# Patient Record
Sex: FEMALE | Race: BLACK OR AFRICAN AMERICAN | NOT HISPANIC OR LATINO | Employment: FULL TIME | URBAN - METROPOLITAN AREA
[De-identification: names, ages, dates, MRNs, and addresses within clinical notes are randomized per-mention and may not be internally consistent; named-entity substitution may affect disease eponyms.]

---

## 2022-05-11 DIAGNOSIS — Z01.818 OTHER SPECIFIED PRE-OPERATIVE EXAMINATION: Primary | ICD-10-CM

## 2022-05-12 ENCOUNTER — LAB VISIT (OUTPATIENT)
Dept: LAB | Facility: HOSPITAL | Age: 37
End: 2022-05-12
Attending: OBSTETRICS & GYNECOLOGY
Payer: MEDICAID

## 2022-05-12 DIAGNOSIS — Z01.818 OTHER SPECIFIED PRE-OPERATIVE EXAMINATION: ICD-10-CM

## 2022-05-12 DIAGNOSIS — Z01.818 OTHER SPECIFIED PRE-OPERATIVE EXAMINATION: Primary | ICD-10-CM

## 2022-05-12 PROCEDURE — 80053 COMPREHEN METABOLIC PANEL: CPT

## 2022-05-13 LAB
ALBUMIN SERPL-MCNC: 4 GM/DL (ref 3.5–5)
ALBUMIN/GLOB SERPL: 1.2 RATIO (ref 1.1–2)
ALP SERPL-CCNC: 80 UNIT/L (ref 40–150)
ALT SERPL-CCNC: 8 UNIT/L (ref 0–55)
AST SERPL-CCNC: 13 UNIT/L (ref 5–34)
BILIRUBIN DIRECT+TOT PNL SERPL-MCNC: 0.8 MG/DL
BUN SERPL-MCNC: 12.7 MG/DL (ref 7–18.7)
CALCIUM SERPL-MCNC: 9.7 MG/DL (ref 8.4–10.2)
CHLORIDE SERPL-SCNC: 106 MMOL/L (ref 98–107)
CO2 SERPL-SCNC: 23 MMOL/L (ref 22–29)
CREAT SERPL-MCNC: 0.85 MG/DL (ref 0.55–1.02)
GLOBULIN SER-MCNC: 3.3 GM/DL (ref 2.4–3.5)
GLUCOSE SERPL-MCNC: 66 MG/DL (ref 74–100)
POTASSIUM SERPL-SCNC: 4.8 MMOL/L (ref 3.5–5.1)
PROT SERPL-MCNC: 7.3 GM/DL (ref 6.4–8.3)
SODIUM SERPL-SCNC: 141 MMOL/L (ref 136–145)

## 2022-05-13 RX ORDER — LABETALOL 100 MG/1
100 TABLET, FILM COATED ORAL EVERY 12 HOURS
COMMUNITY
Start: 2022-02-23 | End: 2023-09-19

## 2022-05-16 ENCOUNTER — HOSPITAL ENCOUNTER (OUTPATIENT)
Facility: HOSPITAL | Age: 37
Discharge: HOME | End: 2022-05-16
Attending: INTERNAL MEDICINE | Admitting: INTERNAL MEDICINE
Payer: MEDICAID

## 2022-05-16 ENCOUNTER — ANESTHESIA EVENT (OUTPATIENT)
Dept: SURGERY | Facility: HOSPITAL | Age: 37
End: 2022-05-16
Payer: MEDICAID

## 2022-05-16 ENCOUNTER — ANESTHESIA (OUTPATIENT)
Dept: SURGERY | Facility: HOSPITAL | Age: 37
End: 2022-05-16
Payer: MEDICAID

## 2022-05-16 DIAGNOSIS — Z30.2 STERILIZATION: Primary | ICD-10-CM

## 2022-05-16 LAB
B-HCG UR QL: NEGATIVE
CTP QC/QA: YES

## 2022-05-16 PROCEDURE — 63600175 PHARM REV CODE 636 W HCPCS: Performed by: NURSE ANESTHETIST, CERTIFIED REGISTERED

## 2022-05-16 PROCEDURE — 71000015 HC POSTOP RECOV 1ST HR: Performed by: OBSTETRICS & GYNECOLOGY

## 2022-05-16 PROCEDURE — 25000003 PHARM REV CODE 250

## 2022-05-16 PROCEDURE — 63600175 PHARM REV CODE 636 W HCPCS: Performed by: ANESTHESIOLOGY

## 2022-05-16 PROCEDURE — 25000003 PHARM REV CODE 250: Performed by: OBSTETRICS & GYNECOLOGY

## 2022-05-16 PROCEDURE — 25000003 PHARM REV CODE 250: Performed by: NURSE ANESTHETIST, CERTIFIED REGISTERED

## 2022-05-16 PROCEDURE — 71000033 HC RECOVERY, INTIAL HOUR: Performed by: OBSTETRICS & GYNECOLOGY

## 2022-05-16 PROCEDURE — 81025 URINE PREGNANCY TEST: CPT | Performed by: OBSTETRICS & GYNECOLOGY

## 2022-05-16 PROCEDURE — 36000708 HC OR TIME LEV III 1ST 15 MIN: Performed by: OBSTETRICS & GYNECOLOGY

## 2022-05-16 PROCEDURE — 63600175 PHARM REV CODE 636 W HCPCS

## 2022-05-16 PROCEDURE — 25000003 PHARM REV CODE 250: Performed by: ANESTHESIOLOGY

## 2022-05-16 PROCEDURE — 00851 ANES IPER PX TUBAL LIGATION: CPT | Performed by: OBSTETRICS & GYNECOLOGY

## 2022-05-16 PROCEDURE — 36000709 HC OR TIME LEV III EA ADD 15 MIN: Performed by: OBSTETRICS & GYNECOLOGY

## 2022-05-16 PROCEDURE — 71000016 HC POSTOP RECOV ADDL HR: Performed by: OBSTETRICS & GYNECOLOGY

## 2022-05-16 PROCEDURE — 37000008 HC ANESTHESIA 1ST 15 MINUTES: Performed by: OBSTETRICS & GYNECOLOGY

## 2022-05-16 PROCEDURE — A4217 STERILE WATER/SALINE, 500 ML: HCPCS | Performed by: OBSTETRICS & GYNECOLOGY

## 2022-05-16 PROCEDURE — 37000009 HC ANESTHESIA EA ADD 15 MINS: Performed by: OBSTETRICS & GYNECOLOGY

## 2022-05-16 RX ORDER — HYDROCODONE BITARTRATE AND ACETAMINOPHEN 5; 325 MG/1; MG/1
1 TABLET ORAL EVERY 4 HOURS PRN
Status: DISCONTINUED | OUTPATIENT
Start: 2022-05-16 | End: 2022-05-16 | Stop reason: HOSPADM

## 2022-05-16 RX ORDER — MEPERIDINE HYDROCHLORIDE 25 MG/ML
12.5 INJECTION INTRAMUSCULAR; INTRAVENOUS; SUBCUTANEOUS EVERY 10 MIN PRN
Status: DISCONTINUED | OUTPATIENT
Start: 2022-05-16 | End: 2022-05-16

## 2022-05-16 RX ORDER — ACETAMINOPHEN 325 MG/1
650 TABLET ORAL EVERY 4 HOURS PRN
Status: DISCONTINUED | OUTPATIENT
Start: 2022-05-16 | End: 2022-05-16 | Stop reason: HOSPADM

## 2022-05-16 RX ORDER — SODIUM CHLORIDE, SODIUM LACTATE, POTASSIUM CHLORIDE, CALCIUM CHLORIDE 600; 310; 30; 20 MG/100ML; MG/100ML; MG/100ML; MG/100ML
INJECTION, SOLUTION INTRAVENOUS CONTINUOUS
Status: DISCONTINUED | OUTPATIENT
Start: 2022-05-16 | End: 2022-05-16 | Stop reason: HOSPADM

## 2022-05-16 RX ORDER — WATER 1 ML/ML
IRRIGANT IRRIGATION
Status: DISCONTINUED | OUTPATIENT
Start: 2022-05-16 | End: 2022-05-16 | Stop reason: HOSPADM

## 2022-05-16 RX ORDER — HYDROCODONE BITARTRATE AND ACETAMINOPHEN 5; 325 MG/1; MG/1
1 TABLET ORAL EVERY 6 HOURS PRN
Qty: 5 TABLET | Refills: 0 | Status: SHIPPED | OUTPATIENT
Start: 2022-05-16 | End: 2022-05-19

## 2022-05-16 RX ORDER — ROCURONIUM BROMIDE 10 MG/ML
INJECTION, SOLUTION INTRAVENOUS
Status: DISCONTINUED | OUTPATIENT
Start: 2022-05-16 | End: 2022-05-16

## 2022-05-16 RX ORDER — KETOROLAC TROMETHAMINE 30 MG/ML
INJECTION, SOLUTION INTRAMUSCULAR; INTRAVENOUS
Status: DISCONTINUED | OUTPATIENT
Start: 2022-05-16 | End: 2022-05-16

## 2022-05-16 RX ORDER — PROCHLORPERAZINE EDISYLATE 5 MG/ML
5 INJECTION INTRAMUSCULAR; INTRAVENOUS EVERY 30 MIN PRN
Status: DISCONTINUED | OUTPATIENT
Start: 2022-05-16 | End: 2022-05-16

## 2022-05-16 RX ORDER — IBUPROFEN 800 MG/1
800 TABLET ORAL EVERY 8 HOURS PRN
Qty: 30 TABLET | Refills: 2 | OUTPATIENT
Start: 2022-05-16 | End: 2023-03-23

## 2022-05-16 RX ORDER — PROPOFOL 10 MG/ML
VIAL (ML) INTRAVENOUS
Status: DISCONTINUED | OUTPATIENT
Start: 2022-05-16 | End: 2022-05-16

## 2022-05-16 RX ORDER — HYDROMORPHONE HYDROCHLORIDE 2 MG/ML
1 INJECTION, SOLUTION INTRAMUSCULAR; INTRAVENOUS; SUBCUTANEOUS EVERY 6 HOURS PRN
Status: DISCONTINUED | OUTPATIENT
Start: 2022-05-16 | End: 2022-05-16 | Stop reason: HOSPADM

## 2022-05-16 RX ORDER — DIPHENHYDRAMINE HYDROCHLORIDE 50 MG/ML
25 INJECTION INTRAMUSCULAR; INTRAVENOUS EVERY 6 HOURS PRN
Status: DISCONTINUED | OUTPATIENT
Start: 2022-05-16 | End: 2022-05-16

## 2022-05-16 RX ORDER — PROCHLORPERAZINE EDISYLATE 5 MG/ML
5 INJECTION INTRAMUSCULAR; INTRAVENOUS EVERY 6 HOURS PRN
Status: DISCONTINUED | OUTPATIENT
Start: 2022-05-16 | End: 2022-05-16 | Stop reason: HOSPADM

## 2022-05-16 RX ORDER — ONDANSETRON 4 MG/1
8 TABLET, ORALLY DISINTEGRATING ORAL EVERY 8 HOURS PRN
Status: DISCONTINUED | OUTPATIENT
Start: 2022-05-16 | End: 2022-05-16 | Stop reason: HOSPADM

## 2022-05-16 RX ORDER — HYDROMORPHONE HYDROCHLORIDE 2 MG/ML
0.2 INJECTION, SOLUTION INTRAMUSCULAR; INTRAVENOUS; SUBCUTANEOUS EVERY 5 MIN PRN
Status: DISCONTINUED | OUTPATIENT
Start: 2022-05-16 | End: 2022-05-16

## 2022-05-16 RX ORDER — SODIUM CHLORIDE, SODIUM GLUCONATE, SODIUM ACETATE, POTASSIUM CHLORIDE AND MAGNESIUM CHLORIDE 30; 37; 368; 526; 502 MG/100ML; MG/100ML; MG/100ML; MG/100ML; MG/100ML
1000 INJECTION, SOLUTION INTRAVENOUS CONTINUOUS
Status: DISCONTINUED | OUTPATIENT
Start: 2022-05-16 | End: 2022-05-16 | Stop reason: HOSPADM

## 2022-05-16 RX ORDER — BUPIVACAINE HCL/EPINEPHRINE 0.5-1:200K
VIAL (ML) INJECTION
Status: DISCONTINUED
Start: 2022-05-16 | End: 2022-05-16 | Stop reason: HOSPADM

## 2022-05-16 RX ORDER — LIDOCAINE HYDROCHLORIDE 10 MG/ML
INJECTION, SOLUTION EPIDURAL; INFILTRATION; INTRACAUDAL; PERINEURAL
Status: DISCONTINUED | OUTPATIENT
Start: 2022-05-16 | End: 2022-05-16

## 2022-05-16 RX ORDER — LIDOCAINE HYDROCHLORIDE 10 MG/ML
1 INJECTION, SOLUTION EPIDURAL; INFILTRATION; INTRACAUDAL; PERINEURAL ONCE
Status: DISCONTINUED | OUTPATIENT
Start: 2022-05-16 | End: 2022-05-16 | Stop reason: HOSPADM

## 2022-05-16 RX ORDER — HYDROCODONE BITARTRATE AND ACETAMINOPHEN 10; 325 MG/1; MG/1
1 TABLET ORAL EVERY 4 HOURS PRN
Status: DISCONTINUED | OUTPATIENT
Start: 2022-05-16 | End: 2022-05-16 | Stop reason: HOSPADM

## 2022-05-16 RX ORDER — ACETAMINOPHEN 500 MG
1000 TABLET ORAL
Status: COMPLETED | OUTPATIENT
Start: 2022-05-16 | End: 2022-05-16

## 2022-05-16 RX ORDER — ONDANSETRON 2 MG/ML
4 INJECTION INTRAMUSCULAR; INTRAVENOUS DAILY PRN
Status: DISCONTINUED | OUTPATIENT
Start: 2022-05-16 | End: 2022-05-16

## 2022-05-16 RX ORDER — IPRATROPIUM BROMIDE AND ALBUTEROL SULFATE 2.5; .5 MG/3ML; MG/3ML
3 SOLUTION RESPIRATORY (INHALATION)
Status: DISCONTINUED | OUTPATIENT
Start: 2022-05-16 | End: 2022-05-16

## 2022-05-16 RX ORDER — MIDAZOLAM HYDROCHLORIDE 1 MG/ML
INJECTION INTRAMUSCULAR; INTRAVENOUS
Status: COMPLETED
Start: 2022-05-16 | End: 2022-05-16

## 2022-05-16 RX ORDER — HYDROMORPHONE HYDROCHLORIDE 2 MG/ML
0.4 INJECTION, SOLUTION INTRAMUSCULAR; INTRAVENOUS; SUBCUTANEOUS EVERY 5 MIN PRN
Status: DISCONTINUED | OUTPATIENT
Start: 2022-05-16 | End: 2022-05-16

## 2022-05-16 RX ORDER — FENTANYL CITRATE 50 UG/ML
INJECTION, SOLUTION INTRAMUSCULAR; INTRAVENOUS
Status: DISCONTINUED | OUTPATIENT
Start: 2022-05-16 | End: 2022-05-16

## 2022-05-16 RX ORDER — ONDANSETRON HYDROCHLORIDE 2 MG/ML
INJECTION, SOLUTION INTRAMUSCULAR; INTRAVENOUS
Status: DISCONTINUED | OUTPATIENT
Start: 2022-05-16 | End: 2022-05-16

## 2022-05-16 RX ORDER — DEXAMETHASONE SODIUM PHOSPHATE 4 MG/ML
INJECTION, SOLUTION INTRA-ARTICULAR; INTRALESIONAL; INTRAMUSCULAR; INTRAVENOUS; SOFT TISSUE
Status: DISCONTINUED | OUTPATIENT
Start: 2022-05-16 | End: 2022-05-16

## 2022-05-16 RX ORDER — MIDAZOLAM HYDROCHLORIDE 1 MG/ML
2 INJECTION INTRAMUSCULAR; INTRAVENOUS
Status: ACTIVE | OUTPATIENT
Start: 2022-05-16 | End: 2022-05-16

## 2022-05-16 RX ORDER — SCOLOPAMINE TRANSDERMAL SYSTEM 1 MG/1
1 PATCH, EXTENDED RELEASE TRANSDERMAL
Status: DISCONTINUED | OUTPATIENT
Start: 2022-05-16 | End: 2022-05-16 | Stop reason: HOSPADM

## 2022-05-16 RX ADMIN — ONDANSETRON 4 MG: 2 INJECTION INTRAMUSCULAR; INTRAVENOUS at 10:05

## 2022-05-16 RX ADMIN — SODIUM CHLORIDE, POTASSIUM CHLORIDE, SODIUM LACTATE AND CALCIUM CHLORIDE: 600; 310; 30; 20 INJECTION, SOLUTION INTRAVENOUS at 10:05

## 2022-05-16 RX ADMIN — SUGAMMADEX 300 MG: 100 INJECTION, SOLUTION INTRAVENOUS at 10:05

## 2022-05-16 RX ADMIN — MIDAZOLAM HYDROCHLORIDE 2 MG: 1 INJECTION, SOLUTION INTRAMUSCULAR; INTRAVENOUS at 08:05

## 2022-05-16 RX ADMIN — ROCURONIUM BROMIDE 50 MG: 10 SOLUTION INTRAVENOUS at 09:05

## 2022-05-16 RX ADMIN — LIDOCAINE HYDROCHLORIDE 40 MG: 10 INJECTION, SOLUTION EPIDURAL; INFILTRATION; INTRACAUDAL; PERINEURAL at 09:05

## 2022-05-16 RX ADMIN — ACETAMINOPHEN 1000 MG: 500 TABLET ORAL at 08:05

## 2022-05-16 RX ADMIN — MIDAZOLAM HYDROCHLORIDE 2 MG: 1 INJECTION INTRAMUSCULAR; INTRAVENOUS at 08:05

## 2022-05-16 RX ADMIN — SODIUM CHLORIDE, POTASSIUM CHLORIDE, SODIUM LACTATE AND CALCIUM CHLORIDE: 600; 310; 30; 20 INJECTION, SOLUTION INTRAVENOUS at 09:05

## 2022-05-16 RX ADMIN — PROPOFOL 200 MG: 10 INJECTION, EMULSION INTRAVENOUS at 09:05

## 2022-05-16 RX ADMIN — KETOROLAC TROMETHAMINE 30 MG: 30 INJECTION, SOLUTION INTRAMUSCULAR at 10:05

## 2022-05-16 RX ADMIN — SCOPOLAMINE 1 PATCH: 1 PATCH TRANSDERMAL at 08:05

## 2022-05-16 RX ADMIN — HYDROMORPHONE HYDROCHLORIDE 0.4 MG: 2 INJECTION, SOLUTION INTRAMUSCULAR; INTRAVENOUS; SUBCUTANEOUS at 10:05

## 2022-05-16 RX ADMIN — DEXAMETHASONE SODIUM PHOSPHATE 4 MG: 4 INJECTION, SOLUTION INTRA-ARTICULAR; INTRALESIONAL; INTRAMUSCULAR; INTRAVENOUS; SOFT TISSUE at 09:05

## 2022-05-16 RX ADMIN — FENTANYL CITRATE 100 MCG: 50 INJECTION, SOLUTION INTRAMUSCULAR; INTRAVENOUS at 09:05

## 2022-05-16 NOTE — ANESTHESIA PROCEDURE NOTES
Intubation    Date/Time: 5/16/2022 9:26 AM  Performed by: Marybel Alexis CRNA  Authorized by: Oscar Lan MD     Intubation:     Induction:  Intravenous    Intubated:  Postinduction    Mask Ventilation:  Easy mask    Attempts:  1    Attempted By:  CRNA    Method of Intubation:  Direct    Blade:  Reynoso 2    Laryngeal View Grade: Grade I - full view of cords      Difficult Airway Encountered?: No      Complications:  None    Airway Device:  Oral endotracheal tube    Airway Device Size:  7.0    Style/Cuff Inflation:  Cuffed (inflated to minimal occlusive pressure)    Inflation Amount (mL):  6    Tube secured:  22    Secured at:  The lips    Placement Verified By:  Capnometry    Complicating Factors:  None    Findings Post-Intubation:  BS equal bilateral and atraumatic/condition of teeth unchanged

## 2022-05-16 NOTE — TRANSFER OF CARE
"Anesthesia Transfer of Care Note    Patient: Areli Loco    Procedure(s) Performed: Procedure(s) (LRB):  LIGATION, FALLOPIAN TUBE, LAPAROSCOPIC (N/A)    Patient location: PACU    Anesthesia Type: general    Transport from OR: Transported from OR on room air with adequate spontaneous ventilation    Post pain: adequate analgesia    Post assessment: no apparent anesthetic complications    Post vital signs: stable    Level of consciousness: sedated    Nausea/Vomiting: no nausea/vomiting    Complications: none    Transfer of care protocol was followed      Last vitals:   Visit Vitals  /88 (BP Location: Left arm, Patient Position: Lying)   Pulse 79   Temp 36.3 °C (97.3 °F) (Tympanic)   Resp (!) 22   Ht 5' 6" (1.676 m)   Wt 122.2 kg (269 lb 6.4 oz)   LMP  (LMP Unknown)   SpO2 95%   Breastfeeding No   BMI 43.48 kg/m²     "

## 2022-05-16 NOTE — ANESTHESIA PREPROCEDURE EVALUATION
05/16/2022  Areli Loco is a 36 y.o., female.    Past Medical History:   Diagnosis Date    Hypertension      Past Surgical History:   Procedure Laterality Date    CHOLECYSTECTOMY       PMH includes HBP, MO, TMJ (does not wear splint)  Pre-op Assessment    I have reviewed the Patient Summary Reports.    I have reviewed the NPO Status.   I have reviewed the Medications.     Review of Systems  Anesthesia Hx:  No problems with previous Anesthesia  Denies Family Hx of Anesthesia complications.   Denies Personal Hx of Anesthesia complications.   Social:  Non-Smoker    EENT/Dental:   Jaw Problems: TMJ  Cardiovascular:   Exercise tolerance: good Hypertension  Denies Angina.  Denies Orthopnea.  Denies PND.  Denies JORDAN.  Functional Capacity good / => 4 METS    Musculoskeletal:  Musculoskeletal Normal    Neurological:   Denies TIA. Denies CVA.    Endocrine:  Morbid Obesity / BMI > 40  Psych:  Psychiatric Normal           Physical Exam  General: Well nourished, Alert and Oriented    Airway:  Mallampati: III   Mouth Opening: Normal  TM Distance: Normal  Tongue: Normal  Neck ROM: Normal ROM    Dental:  Intact    Chest/Lungs:  Clear to auscultation    Heart:  Rate: Normal  Rhythm: Regular Rhythm  No pretibial edema  No carotid bruits    Recent Labs   Lab 05/12/22  1444   WBC 11.3   RBC 4.84   HGB 12.2   HCT 41.1          Recent Labs   Lab 05/12/22  1444      K 4.8   CO2 23   BUN 12.7   CREATININE 0.85   CALCIUM 9.7   ALBUMIN 4.0   ALKPHOS 80   ALT 8   AST 13   BILITOT 0.8           Anesthesia Plan  Type of Anesthesia, risks & benefits discussed:    Anesthesia Type: Gen ETT  Intra-op Monitoring Plan: Standard ASA Monitors  Post Op Pain Control Plan: multimodal analgesia  Induction:  IV  Airway Plan: Direct, Post-Induction  Informed Consent: Informed consent signed with the Patient and all parties understand  the risks and agree with anesthesia plan.  All questions answered. Patient consented to blood products? Yes  ASA Score: 2  Day of Surgery Review of History & Physical: H&P Update referred to the surgeon/provider.    Ready For Surgery From Anesthesia Perspective.     .

## 2022-05-16 NOTE — OP NOTE
OPERATIVE NOTE       SUMMARY      Surgery Date: 2022      SURGEON: Edwin Lopez    ASSISTANT: Staff Scrub    PREOP DIAGNOSIS:  35yo  with Undesired future fertility and desires permanent sterilization.    POSTOP DIAGNOSIS:  Same as above    PROCEDURES:  Laparoscopic bilateral tubal fulguration    ANESTHESIA:  GETA    FINDINGS/KEY COMPONENTS: Normal pelvic anatomy    PROCEDURE IN DETAIL:  After ensuring an informed consent the patient was taken to the operating room where general anesthesia was administered.  She was placed in a dorsal lithotomy position employing the adjustable Lupillo stirrups.  She was prepped and draped in the usual sterile fashion.  Bladder was drained of urine with a flexible catheter.  Time-out was completed.  A sponge stick was placed in the vagina and attention was turned to the infraumbilical region where a 5 mm vertical skin incision was made within the umbilical fold and a 5 mm trocar and port were placed under direct visualization using the [employer] bladeless system.  No evidence of entry damage noted.  Pneumoperitoneum was obtained with CO2 gas to maximum pressure of 15 mmHg.  A 2nd port was placed in the midline just above the symphysis pubis it was placed under direct visualization with no evidence of entry damage noted.  The above findings were noted.  Both incision sites were pre prepped with 1.5 cc of 0.5% Marcaine with epinephrine.  Attention was turned to the left fallopian tube and torres was used to fulgurate the tube to complete desiccation in 3 contiguous spots started in the mid ampullary region going towards the fimbriated end.  No viable tissue noted in between the burn sites.  Excellent hemostasis was appreciated.  The same procedure was carried out on the contralateral side without difficulty.  At this time all instruments removed and the CO2 gas was allowed to escape.  The trocars were removed and the incisions were reapproximated using 4-O Monocryl  in a subcuticular fashion and then covered with a thin layer of Dermabond.  The sponge stick was removed from the vagina and the patient was cleansed of all blood and Betadine, taken out of dorsal lithotomy position awoken from anesthesia and taken to recovery room in stable condition.  Lap sponge instrument needle counts were correct x3.    ESTIMATED BLOOD LOSS: 1cc    Fluids:500cc    COMPLICATIONS: none    PATHOLOGY:   Specimens (From admission, onward)    None

## 2022-05-16 NOTE — ANESTHESIA POSTPROCEDURE EVALUATION
Anesthesia Post Evaluation    Patient: Areli Loco    Procedure(s) Performed: Procedure(s) (LRB):  LIGATION, FALLOPIAN TUBE, LAPAROSCOPIC (N/A)    Final Anesthesia Type: general      Patient location during evaluation: PACU  Patient participation: Yes- Able to Participate  Level of consciousness: awake and alert and oriented  Post-procedure vital signs: reviewed and stable  Pain management: adequate  Airway patency: patent    PONV status at discharge: No PONV  Anesthetic complications: no      Cardiovascular status: hemodynamically stable  Respiratory status: unassisted  Hydration status: euvolemic  Follow-up not needed.          Vitals Value Taken Time   /90 05/16/22 1051   Temp 36.3 °C (97.3 °F) 05/16/22 1019   Pulse 80 05/16/22 1053   Resp 15 05/16/22 1053   SpO2 99 % 05/16/22 1053   Vitals shown include unvalidated device data.      No case tracking events are documented in the log.      Pain/Roseann Score: Pain Rating Prior to Med Admin: 6 (5/16/2022 10:44 AM)  Roseann Score: 9 (5/16/2022 10:50 AM)

## 2022-05-18 VITALS
HEART RATE: 72 BPM | RESPIRATION RATE: 14 BRPM | BODY MASS INDEX: 43.29 KG/M2 | DIASTOLIC BLOOD PRESSURE: 86 MMHG | WEIGHT: 269.38 LBS | HEIGHT: 66 IN | OXYGEN SATURATION: 98 % | SYSTOLIC BLOOD PRESSURE: 127 MMHG | TEMPERATURE: 97 F

## 2022-12-27 ENCOUNTER — HOSPITAL ENCOUNTER (EMERGENCY)
Facility: HOSPITAL | Age: 37
Discharge: LEFT BEFORE DISCHARGE | End: 2022-12-27
Payer: MEDICAID

## 2022-12-27 VITALS
HEART RATE: 71 BPM | DIASTOLIC BLOOD PRESSURE: 98 MMHG | TEMPERATURE: 98 F | OXYGEN SATURATION: 99 % | BODY MASS INDEX: 42.59 KG/M2 | WEIGHT: 265 LBS | RESPIRATION RATE: 16 BRPM | SYSTOLIC BLOOD PRESSURE: 151 MMHG | HEIGHT: 66 IN

## 2022-12-27 LAB
APPEARANCE UR: CLEAR
B-HCG SERPL QL: NEGATIVE
BACTERIA #/AREA URNS AUTO: NORMAL /HPF
BILIRUB UR QL STRIP.AUTO: NEGATIVE MG/DL
COLOR UR AUTO: YELLOW
GLUCOSE UR QL STRIP.AUTO: NEGATIVE MG/DL
KETONES UR QL STRIP.AUTO: NEGATIVE MG/DL
LEUKOCYTE ESTERASE UR QL STRIP.AUTO: NEGATIVE UNIT/L
NITRITE UR QL STRIP.AUTO: NEGATIVE
PH UR STRIP.AUTO: 6.5 [PH]
PROT UR QL STRIP.AUTO: NEGATIVE MG/DL
RBC #/AREA URNS AUTO: NORMAL /HPF
RBC UR QL AUTO: NEGATIVE UNIT/L
SP GR UR STRIP.AUTO: 1.02
SQUAMOUS #/AREA URNS AUTO: NORMAL /HPF
UROBILINOGEN UR STRIP-ACNC: 0.2 MG/DL
WBC #/AREA URNS AUTO: NORMAL /HPF

## 2022-12-27 PROCEDURE — 99900041 HC LEFT WITHOUT BEING SEEN- EMERGENCY

## 2022-12-27 PROCEDURE — 81025 URINE PREGNANCY TEST: CPT | Performed by: EMERGENCY MEDICINE

## 2022-12-27 PROCEDURE — 81001 URINALYSIS AUTO W/SCOPE: CPT | Performed by: EMERGENCY MEDICINE

## 2022-12-27 PROCEDURE — 81003 URINALYSIS AUTO W/O SCOPE: CPT | Performed by: EMERGENCY MEDICINE

## 2023-03-23 ENCOUNTER — HOSPITAL ENCOUNTER (EMERGENCY)
Facility: HOSPITAL | Age: 38
Discharge: HOME | End: 2023-03-23
Attending: FAMILY MEDICINE
Payer: MEDICAID

## 2023-03-23 VITALS
TEMPERATURE: 99 F | HEART RATE: 102 BPM | SYSTOLIC BLOOD PRESSURE: 133 MMHG | HEIGHT: 66 IN | DIASTOLIC BLOOD PRESSURE: 95 MMHG | RESPIRATION RATE: 20 BRPM | BODY MASS INDEX: 41.46 KG/M2 | OXYGEN SATURATION: 99 % | WEIGHT: 258 LBS

## 2023-03-23 DIAGNOSIS — G89.29 CHRONIC BILATERAL LOW BACK PAIN WITHOUT SCIATICA: Primary | ICD-10-CM

## 2023-03-23 DIAGNOSIS — M54.50 CHRONIC BILATERAL LOW BACK PAIN WITHOUT SCIATICA: Primary | ICD-10-CM

## 2023-03-23 DIAGNOSIS — R52 PAIN: ICD-10-CM

## 2023-03-23 DIAGNOSIS — M75.42 IMPINGEMENT SYNDROME OF LEFT SHOULDER: ICD-10-CM

## 2023-03-23 LAB
ANION GAP SERPL CALC-SCNC: 9 MEQ/L
BASOPHILS # BLD AUTO: 0.04 X10(3)/MCL (ref 0–0.2)
BASOPHILS NFR BLD AUTO: 0.4 %
BUN SERPL-MCNC: 11.3 MG/DL (ref 7–18.7)
CALCIUM SERPL-MCNC: 9.2 MG/DL (ref 8.4–10.2)
CHLORIDE SERPL-SCNC: 103 MMOL/L (ref 98–107)
CO2 SERPL-SCNC: 25 MMOL/L (ref 22–29)
CREAT SERPL-MCNC: 0.82 MG/DL (ref 0.55–1.02)
CREAT/UREA NIT SERPL: 14
EOSINOPHIL # BLD AUTO: 0.17 X10(3)/MCL (ref 0–0.9)
EOSINOPHIL NFR BLD AUTO: 1.6 %
ERYTHROCYTE [DISTWIDTH] IN BLOOD BY AUTOMATED COUNT: 14.1 % (ref 11.5–17)
GFR SERPLBLD CREATININE-BSD FMLA CKD-EPI: >60 MLS/MIN/1.73/M2
GLUCOSE SERPL-MCNC: 90 MG/DL (ref 74–100)
HCT VFR BLD AUTO: 39.7 % (ref 37–47)
HGB BLD-MCNC: 12.4 G/DL (ref 12–16)
IMM GRANULOCYTES # BLD AUTO: 0.01 X10(3)/MCL (ref 0–0.04)
IMM GRANULOCYTES NFR BLD AUTO: 0.1 %
LYMPHOCYTES # BLD AUTO: 2.7 X10(3)/MCL (ref 0.6–4.6)
LYMPHOCYTES NFR BLD AUTO: 26.1 %
MAGNESIUM SERPL-MCNC: 1.8 MG/DL (ref 1.6–2.6)
MCH RBC QN AUTO: 25.4 PG
MCHC RBC AUTO-ENTMCNC: 31.2 G/DL (ref 33–36)
MCV RBC AUTO: 81.2 FL (ref 80–94)
MONOCYTES # BLD AUTO: 0.64 X10(3)/MCL (ref 0.1–1.3)
MONOCYTES NFR BLD AUTO: 6.2 %
NEUTROPHILS # BLD AUTO: 6.78 X10(3)/MCL (ref 2.1–9.2)
NEUTROPHILS NFR BLD AUTO: 65.6 %
PLATELET # BLD AUTO: 323 X10(3)/MCL (ref 130–400)
PMV BLD AUTO: 9.9 FL (ref 7.4–10.4)
POC CARDIAC TROPONIN I: 0 NG/ML (ref 0–0.08)
POC CARDIAC TROPONIN I: 0 NG/ML (ref 0–0.08)
POTASSIUM SERPL-SCNC: 3.8 MMOL/L (ref 3.5–5.1)
RBC # BLD AUTO: 4.89 X10(6)/MCL (ref 4.2–5.4)
SAMPLE: NORMAL
SAMPLE: NORMAL
SODIUM SERPL-SCNC: 137 MMOL/L (ref 136–145)
WBC # SPEC AUTO: 10.3 X10(3)/MCL (ref 4.5–11.5)

## 2023-03-23 PROCEDURE — 84484 ASSAY OF TROPONIN QUANT: CPT

## 2023-03-23 PROCEDURE — 93010 EKG 12-LEAD: ICD-10-PCS | Mod: ,,, | Performed by: INTERNAL MEDICINE

## 2023-03-23 PROCEDURE — 80048 BASIC METABOLIC PNL TOTAL CA: CPT | Performed by: FAMILY MEDICINE

## 2023-03-23 PROCEDURE — 99285 EMERGENCY DEPT VISIT HI MDM: CPT | Mod: 25

## 2023-03-23 PROCEDURE — 83735 ASSAY OF MAGNESIUM: CPT | Performed by: FAMILY MEDICINE

## 2023-03-23 PROCEDURE — 93005 ELECTROCARDIOGRAM TRACING: CPT

## 2023-03-23 PROCEDURE — 85025 COMPLETE CBC W/AUTO DIFF WBC: CPT | Performed by: FAMILY MEDICINE

## 2023-03-23 PROCEDURE — 93010 ELECTROCARDIOGRAM REPORT: CPT | Mod: ,,, | Performed by: INTERNAL MEDICINE

## 2023-03-23 RX ORDER — KETOROLAC TROMETHAMINE 10 MG/1
10 TABLET, FILM COATED ORAL EVERY 6 HOURS
Qty: 15 TABLET | Refills: 0 | Status: SHIPPED | OUTPATIENT
Start: 2023-03-23 | End: 2023-03-28

## 2023-03-23 NOTE — ED PROVIDER NOTES
Encounter Date: 3/23/2023       History     Chief Complaint   Patient presents with    Back Pain     Mid and lower back pain x 6 mths  pain now radiates to L arm -burning pain --denies injury      37-year-old presents with 2 different complaints.  One she is been having chronic lower back pain for the past 6 months just worse with movements has never seen a PCP about this no history of injuries no fevers no chills also complains of some left arm pain starting today feels like it starts in her neck and goes down her arm no chest pain no shortness of breath no diaphoresis no neurological deficits or other complaints arm pain is worse with movements lifting      Review of patient's allergies indicates:  No Known Allergies  Past Medical History:   Diagnosis Date    Hypertension      Past Surgical History:   Procedure Laterality Date    CHOLECYSTECTOMY      LAPAROSCOPIC LIGATION OF FALLOPIAN TUBE Bilateral 5/16/2022    Procedure: LIGATION, FALLOPIAN TUBE, LAPAROSCOPIC;  Surgeon: Edwin Lopez DO;  Location: [facility];  Service: OB/GYN;  Laterality: Bilateral;     Family History   Problem Relation Age of Onset    Hypertension Father      Social History     Tobacco Use    Smoking status: Never    Smokeless tobacco: Never   Substance Use Topics    Alcohol use: Never    Drug use: Never     Review of Systems   Constitutional:  Negative for fever.   HENT:  Negative for sore throat.    Respiratory:  Negative for shortness of breath.    Cardiovascular:  Negative for chest pain.   Gastrointestinal:  Negative for nausea.   Genitourinary:  Negative for dysuria.   Musculoskeletal:  Negative for back pain.   Skin:  Negative for rash.   Neurological:  Negative for weakness.   Hematological:  Does not bruise/bleed easily.   All other systems reviewed and are negative.    Physical Exam     Initial Vitals [03/23/23 1008]   BP Pulse Resp Temp SpO2   (!) 133/95 102 20 98.8 °F (37.1 °C) 99 %      MAP       --         Physical  Exam    Nursing note and vitals reviewed.  Constitutional: She appears well-developed and well-nourished. She is active.   HENT:   Head: Normocephalic and atraumatic.   Eyes: Conjunctivae, EOM and lids are normal. Pupils are equal, round, and reactive to light.   Neck: Trachea normal and phonation normal. Neck supple. No thyroid mass present.   Normal range of motion.  Cardiovascular:  Normal rate, regular rhythm, normal heart sounds and normal pulses.           Pulmonary/Chest: Breath sounds normal.   Abdominal: Abdomen is soft. Bowel sounds are normal.   Musculoskeletal:         General: Tenderness present.      Cervical back: Normal range of motion and neck supple.     Neurological: She is alert and oriented to person, place, and time. She has normal strength and normal reflexes.   Skin: Skin is warm and intact.   Psychiatric: She has a normal mood and affect. Her speech is normal and behavior is normal. Judgment and thought content normal. Cognition and memory are normal.       ED Course   Procedures  Labs Reviewed   CBC WITH DIFFERENTIAL - Abnormal; Notable for the following components:       Result Value    MCHC 31.2 (*)     All other components within normal limits   MAGNESIUM - Normal   CBC W/ AUTO DIFFERENTIAL    Narrative:     The following orders were created for panel order CBC Auto Differential.  Procedure                               Abnormality         Status                     ---------                               -----------         ------                     CBC with Differential[[id number]]        Abnormal            Final result                 Please view results for these tests on the individual orders.   BASIC METABOLIC PANEL   TROPONIN ISTAT   POCT TROPONIN   POCT TROPONIN     EKG Readings: (Independently Interpreted)   Initial Reading: No STEMI. Rhythm: Normal Sinus Rhythm. Heart Rate: 88. Ectopy: No Ectopy. Conduction: Normal. ST Segments: Normal ST Segments. T Waves: Normal. Clinical  Impression: Normal Sinus Rhythm     Imaging Results              X-Ray Chest 1 View (Final result)  Result time 03/23/23 10:54:04      Final result by Michelle Gu MD (03/23/23 10:54:04)                   Impression:      No acute abnormality of the chest.      Electronically signed by: Michelle Gu  Date:    03/23/2023  Time:    10:54               Narrative:    EXAMINATION:  XR CHEST 1 VIEW    CLINICAL HISTORY:  Pain, unspecified    TECHNIQUE:  AP chest    COMPARISON:  None.    FINDINGS:  The heart is normal in size.  The lungs are clear.  There is no pleural effusion or visible pneumothorax.                                       X-Ray Cervical Spine AP And Lateral (Final result)  Result time 03/23/23 10:53:42      Final result by Michelle Gu MD (03/23/23 10:53:42)                   Impression:      No acute bony abnormality.      Electronically signed by: Michelle Gu  Date:    03/23/2023  Time:    10:53               Narrative:    EXAMINATION:  XR CERVICAL SPINE AP LATERAL    CLINICAL HISTORY:  Pain, unspecified    COMPARISON:  None.    FINDINGS:  There is reversal of normal cervical lordosis.  The vertebral body heights and disc spaces are maintained.  The soft tissues are unremarkable.                                       Medications - No data to display  Medical Decision Making:   Initial Assessment:   37-year-old initially presents with some chronic lower back pain but also complains of some left arm pain for the past day worse with movements no fevers no chills no diaphoresis vital signs were stable EKG was unremarkable initially appeared to be musculoskeletal pain in the left shoulder  Differential Diagnosis:   Referred down to the arm differential diagnosis cardiac issues versus pinched nerve in his shoulder impingement syndrome versus pinched nerve in the neck musculoskeletal pain  Clinical Tests:   Lab Tests: Ordered and Reviewed  The following lab test(s) were unremarkable: CBC,  BMP and Troponin  Radiological Study: Ordered and Reviewed  Medical Tests: Ordered and Reviewed  ED Management:  EKGs reviewed independently normal sinus rhythm no acute changes patient also had x-ray done was independently reviewed unremarkable patient put on telemetry monitor to monitor no cardiac issues feels all musculoskeletal           ED Course as of 03/23/23 1250   Thu Mar 23, 2023   1058 WBC: 10.3 [BL]   1058 Hemoglobin: 12.4 [BL]   1058 Hematocrit: 39.7 [BL]   1058 POC Cardiac Troponin I: 0.00 [BL]   1155 Magnesium: 1.80 [BL]   1155 Sodium: 137 [BL]   1155 Potassium: 3.8 [BL]   1155 Glucose: 90 [BL]   1155 POC Cardiac Troponin I: 0.00 [BL]   1155 WBC: 10.3 [BL]   1155 Hemoglobin: 12.4 [BL]   1155 Hematocrit: 39.7 [BL]      ED Course User Index  [BL] Dago Reece MD                 Clinical Impression:   Final diagnoses:  [R52] Pain  [M54.50, G89.29] Chronic bilateral low back pain without sciatica (Primary)  [M75.42] Impingement syndrome of left shoulder        ED Disposition Condition    Discharge Stable          ED Prescriptions       Medication Sig Dispense Start Date End Date Auth. Provider    ketorolac (TORADOL) 10 mg tablet Take 1 tablet (10 mg total) by mouth every 6 (six) hours. for 5 days 15 tablet 3/23/2023 3/28/2023 Dago Reece MD          Follow-up Information       Follow up With Specialties Details Why Contact Info    Toro Jha MD Family Medicine In 1 day  [address] [place], [place] [id number] [phone]               Dago Reece MD  03/23/23 0211

## 2023-03-31 DIAGNOSIS — M54.12 CERVICAL RADICULOPATHY: Primary | ICD-10-CM

## 2023-03-31 DIAGNOSIS — M54.16 LUMBAR RADICULOPATHY: ICD-10-CM

## 2023-09-19 ENCOUNTER — OFFICE VISIT (OUTPATIENT)
Dept: FAMILY MEDICINE | Facility: CLINIC | Age: 38
End: 2023-09-19
Payer: COMMERCIAL

## 2023-09-19 ENCOUNTER — PATIENT OUTREACH (OUTPATIENT)
Dept: ADMINISTRATIVE | Facility: HOSPITAL | Age: 38
End: 2023-09-19
Payer: COMMERCIAL

## 2023-09-19 ENCOUNTER — TELEPHONE (OUTPATIENT)
Dept: FAMILY MEDICINE | Facility: CLINIC | Age: 38
End: 2023-09-19

## 2023-09-19 VITALS
WEIGHT: 264.19 LBS | DIASTOLIC BLOOD PRESSURE: 87 MMHG | SYSTOLIC BLOOD PRESSURE: 119 MMHG | RESPIRATION RATE: 18 BRPM | HEART RATE: 76 BPM | BODY MASS INDEX: 42.46 KG/M2 | HEIGHT: 66 IN | OXYGEN SATURATION: 98 % | TEMPERATURE: 98 F

## 2023-09-19 DIAGNOSIS — Z13.1 SCREENING FOR DIABETES MELLITUS: ICD-10-CM

## 2023-09-19 DIAGNOSIS — M54.16 CHRONIC RADICULAR PAIN OF LOWER BACK: Primary | ICD-10-CM

## 2023-09-19 DIAGNOSIS — Z13.6 ENCOUNTER FOR LIPID SCREENING FOR CARDIOVASCULAR DISEASE: ICD-10-CM

## 2023-09-19 DIAGNOSIS — G89.29 CHRONIC RADICULAR PAIN OF LOWER BACK: Primary | ICD-10-CM

## 2023-09-19 DIAGNOSIS — Z00.00 ENCOUNTER FOR WELLNESS EXAMINATION: ICD-10-CM

## 2023-09-19 DIAGNOSIS — Z13.220 ENCOUNTER FOR LIPID SCREENING FOR CARDIOVASCULAR DISEASE: ICD-10-CM

## 2023-09-19 PROBLEM — Z30.2 STERILIZATION: Status: RESOLVED | Noted: 2022-05-16 | Resolved: 2023-09-19

## 2023-09-19 PROCEDURE — 3074F PR MOST RECENT SYSTOLIC BLOOD PRESSURE < 130 MM HG: ICD-10-PCS | Mod: CPTII,,, | Performed by: FAMILY MEDICINE

## 2023-09-19 PROCEDURE — 1159F MED LIST DOCD IN RCRD: CPT | Mod: CPTII,,, | Performed by: FAMILY MEDICINE

## 2023-09-19 PROCEDURE — 99203 PR OFFICE/OUTPT VISIT, NEW, LEVL III, 30-44 MIN: ICD-10-PCS | Mod: ,,, | Performed by: FAMILY MEDICINE

## 2023-09-19 PROCEDURE — 1160F RVW MEDS BY RX/DR IN RCRD: CPT | Mod: CPTII,,, | Performed by: FAMILY MEDICINE

## 2023-09-19 PROCEDURE — 1159F PR MEDICATION LIST DOCUMENTED IN MEDICAL RECORD: ICD-10-PCS | Mod: CPTII,,, | Performed by: FAMILY MEDICINE

## 2023-09-19 PROCEDURE — 99203 OFFICE O/P NEW LOW 30 MIN: CPT | Mod: ,,, | Performed by: FAMILY MEDICINE

## 2023-09-19 PROCEDURE — 3079F DIAST BP 80-89 MM HG: CPT | Mod: CPTII,,, | Performed by: FAMILY MEDICINE

## 2023-09-19 PROCEDURE — 3008F PR BODY MASS INDEX (BMI) DOCUMENTED: ICD-10-PCS | Mod: CPTII,,, | Performed by: FAMILY MEDICINE

## 2023-09-19 PROCEDURE — 3008F BODY MASS INDEX DOCD: CPT | Mod: CPTII,,, | Performed by: FAMILY MEDICINE

## 2023-09-19 PROCEDURE — 3079F PR MOST RECENT DIASTOLIC BLOOD PRESSURE 80-89 MM HG: ICD-10-PCS | Mod: CPTII,,, | Performed by: FAMILY MEDICINE

## 2023-09-19 PROCEDURE — 1160F PR REVIEW ALL MEDS BY PRESCRIBER/CLIN PHARMACIST DOCUMENTED: ICD-10-PCS | Mod: CPTII,,, | Performed by: FAMILY MEDICINE

## 2023-09-19 PROCEDURE — 3074F SYST BP LT 130 MM HG: CPT | Mod: CPTII,,, | Performed by: FAMILY MEDICINE

## 2023-09-19 RX ORDER — CYCLOBENZAPRINE HCL 10 MG
10 TABLET ORAL 3 TIMES DAILY PRN
Qty: 30 TABLET | Refills: 1 | Status: SHIPPED | OUTPATIENT
Start: 2023-09-19 | End: 2023-09-29

## 2023-09-19 NOTE — LETTER
"  This communication is flagged as high priority.        AUTHORIZATION FOR RELEASE OF   CONFIDENTIAL INFORMATION    Dear Staff    We are seeing Areli Loco, date of birth 1985, in the clinic at [facility]. Katia Renae MD is the patient's PCP. Areli Loco has an outstanding lab/procedure at the time we reviewed her chart. In order to help keep her health information updated, she has authorized us to request the following medical record(s):        (  )  MAMMOGRAM                                      (  )  COLONOSCOPY      (xx  )  PAP SMEAR                                          (  )  OUTSIDE LAB RESULTS     (  )  DEXA SCAN                                          (  )  EYE EXAM            (  )  FOOT EXAM                                          (  )  ENTIRE RECORD     (  )  OUTSIDE IMMUNIZATIONS                 (  )  _______________         Please fax records to [facility], Bienvenu-Oubre, Shauna, MD,  [phone]  Attn: Kelli       If you have any questions, please contact Maria Esther Alarcon (Connie) ,Care Coordinator @ [phone]     Patient Name: Areli Loco  : 1985  Patient Phone #: [phone]     "

## 2023-09-19 NOTE — PROGRESS NOTES
"Areli Loco  09/19/2023  [id number]    Katia Renae MD  Patient Care Team:  Katia Renae MD as PCP - General (Family Medicine)      Chief Complaint:  Chief Complaint   Patient presents with    Establish Care     Needs PCP       History of Present Illness:  HPI    38 y.o. female who presents today c/o chronic lower back pain and mid back pain intermittently for two years. 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 States all she has had done is xrays and no dedicated PT and no mri. NO leg weakness. Pain radiates to anterior thighs bilaterally and is described as a throbbing pain. She takes ibuprofen prn and it does help.     Review of Systems  General: denies f/c, weight loss, night sweats, decreased appetite  Eye: denies blurred vision, changes in vision  Respiratory: denies sob, wheezing, cough  Cardiovascular: denies chest pain, palpitations, edema  Gastrointestinal: denies abdominal pain, n/v, constipation, diarrhea  Integumentary: denies rashes, pruritis        Health Maintenance  Health Maintenance Topics with due status: Not Due       Topic Last Completion Date    TETANUS VACCINE 04/19/2020     Health Maintenance Due   Topic Date Due    Hepatitis C Screening  Never done    Cervical Cancer Screening  Never done    Lipid Panel  Never done    HIV Screening  Never done    Hemoglobin A1c (Diabetic Prevention Screening)  Never done    COVID-19 Vaccine (3 - Pfizer series) 04/06/2022    Influenza Vaccine (1) 09/01/2023       Exam:  Vitals:    09/19/23 1025   BP: 119/87   BP Location: Left arm   Patient Position: Sitting   BP Method: Large (Automatic)   Pulse: 76   Resp: 18   Temp: 98.1 °F (36.7 °C)   TempSrc: Oral   SpO2: 98%   Weight: 119.8 kg (264 lb 3.2 oz)   Height: 5' 6" (1.676 m)     Weight: 119.8 kg (264 lb 3.2 oz)   Body mass index is 42.64 kg/m².      Physical Exam  Constitutional: NAD, alert, pleasant  Respiratory: CTAB, no wheezes, rales or rhonchi. No accessory " muscle use  Eyes: EOMI  Cardiovascular: RRR, No m/r/g. No JVD. No LE edema  MSK: lumbar vertebral tenderness and paraspinus muscle tendernessl. Normal gait. 5/5 bl le strength and 2+ patellar reflexes  Integumentary: warm, dry, intact  Psych: AA&Ox3      ICD-10-CM ICD-9-CM   1. Chronic radicular pain of lower back  M54.16 724.4    G89.29 338.29   2. Encounter for wellness examination  Z00.00 V70.0   3. Encounter for lipid screening for cardiovascular disease  Z13.220 V77.91    Z13.6 V81.2   4. Screening for diabetes mellitus  Z13.1 V77.1       1. Chronic radicular pain of lower back  -     X-Ray Lumbar Spine 2 Or 3 Views; Future; Expected date: 09/19/2023  -     Ambulatory referral/consult to Physical/Occupational Therapy; Future; Expected date: 09/19/2023  -     cyclobenzaprine (FLEXERIL) 10 MG tablet; Take 1 tablet (10 mg total) by mouth 3 (three) times daily as needed for Muscle spasms.  Dispense: 30 tablet; Refill: 1    2. Encounter for wellness examination  -     CBC Auto Differential; Future; Expected date: 03/19/2024  -     Comprehensive Metabolic Panel; Future; Expected date: 03/19/2024  -     Lipid Panel; Future; Expected date: 03/19/2024  -     TSH; Future; Expected date: 03/19/2024  -     Hemoglobin A1C; Future; Expected date: 03/19/2024  -     Urinalysis; Future; Expected date: 03/19/2024    3. Encounter for lipid screening for cardiovascular disease  -     Lipid Panel; Future; Expected date: 03/19/2024    4. Screening for diabetes mellitus  -     Hemoglobin A1C; Future; Expected date: 03/19/2024       Xray today  Continue nsaids prn with food  Take flexeril prn. Do not drink or drive with medication  Refer to outpatient pt and if pain persists, will get mri lumbar spine    Follow up: Follow up for wellness labs july 2024.      Care Plan/Goals: Reviewed   Goals    None

## 2023-10-10 ENCOUNTER — TELEPHONE (OUTPATIENT)
Dept: FAMILY MEDICINE | Facility: CLINIC | Age: 38
End: 2023-10-10
Payer: COMMERCIAL

## 2023-10-10 NOTE — TELEPHONE ENCOUNTER
----- Message from Trudy Amador sent at 10/10/2023  8:41 AM CDT -----  Regarding: test results  .Type:  Test Results    Who Called: Pt  Name of Test (Lab/Mammo/Etc): X-Ray  Date of Test:   Ordering Provider: Abdoulaye  Where the test was performed:   Would the patient rather a call back or a response via [facility]? Call back  Best Call Back Number: [phone]  Additional Information:  Pt returning missed call.

## 2024-03-11 ENCOUNTER — LAB VISIT (OUTPATIENT)
Dept: LAB | Facility: HOSPITAL | Age: 39
End: 2024-03-11
Attending: FAMILY MEDICINE
Payer: COMMERCIAL

## 2024-03-11 DIAGNOSIS — Z13.1 SCREENING FOR DIABETES MELLITUS: ICD-10-CM

## 2024-03-11 DIAGNOSIS — Z00.00 ENCOUNTER FOR WELLNESS EXAMINATION: ICD-10-CM

## 2024-03-11 DIAGNOSIS — Z13.220 ENCOUNTER FOR LIPID SCREENING FOR CARDIOVASCULAR DISEASE: ICD-10-CM

## 2024-03-11 DIAGNOSIS — Z13.6 ENCOUNTER FOR LIPID SCREENING FOR CARDIOVASCULAR DISEASE: ICD-10-CM

## 2024-03-11 LAB
ALBUMIN SERPL-MCNC: 3.5 G/DL (ref 3.5–5)
ALBUMIN/GLOB SERPL: 1 RATIO (ref 1.1–2)
ALP SERPL-CCNC: 70 UNIT/L (ref 40–150)
ALT SERPL-CCNC: 9 UNIT/L (ref 0–55)
APPEARANCE UR: CLEAR
AST SERPL-CCNC: 9 UNIT/L (ref 5–34)
BACTERIA #/AREA URNS AUTO: ABNORMAL /HPF
BASOPHILS # BLD AUTO: 0.02 X10(3)/MCL
BASOPHILS NFR BLD AUTO: 0.2 %
BILIRUB SERPL-MCNC: 0.6 MG/DL
BILIRUB UR QL STRIP.AUTO: NEGATIVE
BUN SERPL-MCNC: 11.1 MG/DL (ref 7–18.7)
CALCIUM SERPL-MCNC: 8.7 MG/DL (ref 8.4–10.2)
CHLORIDE SERPL-SCNC: 104 MMOL/L (ref 98–107)
CHOLEST SERPL-MCNC: 179 MG/DL
CHOLEST/HDLC SERPL: 4 {RATIO} (ref 0–5)
CO2 SERPL-SCNC: 27 MMOL/L (ref 22–29)
COLOR UR AUTO: ABNORMAL
CREAT SERPL-MCNC: 0.79 MG/DL (ref 0.55–1.02)
EOSINOPHIL # BLD AUTO: 0.19 X10(3)/MCL (ref 0–0.9)
EOSINOPHIL NFR BLD AUTO: 2 %
ERYTHROCYTE [DISTWIDTH] IN BLOOD BY AUTOMATED COUNT: 14.3 % (ref 11.5–17)
EST. AVERAGE GLUCOSE BLD GHB EST-MCNC: 116.9 MG/DL
GFR SERPLBLD CREATININE-BSD FMLA CKD-EPI: >60 MLS/MIN/1.73/M2
GLOBULIN SER-MCNC: 3.4 GM/DL (ref 2.4–3.5)
GLUCOSE SERPL-MCNC: 79 MG/DL (ref 74–100)
GLUCOSE UR QL STRIP.AUTO: NORMAL
HBA1C MFR BLD: 5.7 %
HCT VFR BLD AUTO: 38.9 % (ref 37–47)
HDLC SERPL-MCNC: 43 MG/DL (ref 35–60)
HGB BLD-MCNC: 12.4 G/DL (ref 12–16)
IMM GRANULOCYTES # BLD AUTO: 0.04 X10(3)/MCL (ref 0–0.04)
IMM GRANULOCYTES NFR BLD AUTO: 0.4 %
KETONES UR QL STRIP.AUTO: NEGATIVE
LDLC SERPL CALC-MCNC: 120 MG/DL (ref 50–140)
LEUKOCYTE ESTERASE UR QL STRIP.AUTO: NEGATIVE
LYMPHOCYTES # BLD AUTO: 2.39 X10(3)/MCL (ref 0.6–4.6)
LYMPHOCYTES NFR BLD AUTO: 25 %
MCH RBC QN AUTO: 25.5 PG (ref 27–31)
MCHC RBC AUTO-ENTMCNC: 31.9 G/DL (ref 33–36)
MCV RBC AUTO: 80 FL (ref 80–94)
MONOCYTES # BLD AUTO: 0.61 X10(3)/MCL (ref 0.1–1.3)
MONOCYTES NFR BLD AUTO: 6.4 %
MUCOUS THREADS URNS QL MICRO: ABNORMAL /LPF
NEUTROPHILS # BLD AUTO: 6.3 X10(3)/MCL (ref 2.1–9.2)
NEUTROPHILS NFR BLD AUTO: 66 %
NITRITE UR QL STRIP.AUTO: NEGATIVE
NRBC BLD AUTO-RTO: 0 %
PH UR STRIP.AUTO: 6.5 [PH]
PLATELET # BLD AUTO: 370 X10(3)/MCL (ref 130–400)
PMV BLD AUTO: 10.5 FL (ref 7.4–10.4)
POTASSIUM SERPL-SCNC: 4.7 MMOL/L (ref 3.5–5.1)
PROT SERPL-MCNC: 6.9 GM/DL (ref 6.4–8.3)
PROT UR QL STRIP.AUTO: ABNORMAL
RBC # BLD AUTO: 4.86 X10(6)/MCL (ref 4.2–5.4)
RBC #/AREA URNS AUTO: ABNORMAL /HPF
RBC UR QL AUTO: ABNORMAL
SODIUM SERPL-SCNC: 140 MMOL/L (ref 136–145)
SP GR UR STRIP.AUTO: 1.03 (ref 1–1.03)
SQUAMOUS #/AREA URNS LPF: ABNORMAL /HPF
TRIGL SERPL-MCNC: 79 MG/DL (ref 37–140)
TSH SERPL-ACNC: 1.18 UIU/ML (ref 0.35–4.94)
UROBILINOGEN UR STRIP-ACNC: NORMAL
VLDLC SERPL CALC-MCNC: 16 MG/DL
WBC # SPEC AUTO: 9.55 X10(3)/MCL (ref 4.5–11.5)
WBC #/AREA URNS AUTO: ABNORMAL /HPF

## 2024-03-11 PROCEDURE — 84443 ASSAY THYROID STIM HORMONE: CPT

## 2024-03-11 PROCEDURE — 85025 COMPLETE CBC W/AUTO DIFF WBC: CPT

## 2024-03-11 PROCEDURE — 36415 COLL VENOUS BLD VENIPUNCTURE: CPT

## 2024-03-11 PROCEDURE — 80061 LIPID PANEL: CPT

## 2024-03-11 PROCEDURE — 83036 HEMOGLOBIN GLYCOSYLATED A1C: CPT

## 2024-03-11 PROCEDURE — 81001 URINALYSIS AUTO W/SCOPE: CPT

## 2024-03-11 PROCEDURE — 80053 COMPREHEN METABOLIC PANEL: CPT

## 2024-03-18 ENCOUNTER — OFFICE VISIT (OUTPATIENT)
Dept: FAMILY MEDICINE | Facility: CLINIC | Age: 39
End: 2024-03-18
Payer: COMMERCIAL

## 2024-03-18 VITALS
SYSTOLIC BLOOD PRESSURE: 121 MMHG | OXYGEN SATURATION: 98 % | RESPIRATION RATE: 18 BRPM | DIASTOLIC BLOOD PRESSURE: 86 MMHG | BODY MASS INDEX: 44.42 KG/M2 | HEART RATE: 87 BPM | TEMPERATURE: 98 F | WEIGHT: 276.38 LBS | HEIGHT: 66 IN

## 2024-03-18 DIAGNOSIS — Z12.4 CERVICAL CANCER SCREENING: ICD-10-CM

## 2024-03-18 DIAGNOSIS — Z13.1 SCREENING FOR DIABETES MELLITUS: ICD-10-CM

## 2024-03-18 DIAGNOSIS — Z11.4 ENCOUNTER FOR SCREENING FOR HIV: ICD-10-CM

## 2024-03-18 DIAGNOSIS — E66.01 CLASS 3 OBESITY: ICD-10-CM

## 2024-03-18 DIAGNOSIS — Z00.00 ENCOUNTER FOR WELLNESS EXAMINATION: Primary | ICD-10-CM

## 2024-03-18 DIAGNOSIS — Z11.59 NEED FOR HEPATITIS C SCREENING TEST: ICD-10-CM

## 2024-03-18 DIAGNOSIS — Z13.6 ENCOUNTER FOR LIPID SCREENING FOR CARDIOVASCULAR DISEASE: ICD-10-CM

## 2024-03-18 DIAGNOSIS — Z13.220 ENCOUNTER FOR LIPID SCREENING FOR CARDIOVASCULAR DISEASE: ICD-10-CM

## 2024-03-18 DIAGNOSIS — R30.0 DYSURIA: ICD-10-CM

## 2024-03-18 PROBLEM — E66.813 CLASS 3 OBESITY: Status: ACTIVE | Noted: 2024-03-18

## 2024-03-18 LAB
APPEARANCE UR: ABNORMAL
BACTERIA #/AREA URNS AUTO: ABNORMAL /HPF
BILIRUB UR QL STRIP.AUTO: NEGATIVE
COLOR UR AUTO: ABNORMAL
GLUCOSE UR QL STRIP.AUTO: NORMAL
KETONES UR QL STRIP.AUTO: NEGATIVE
LEUKOCYTE ESTERASE UR QL STRIP.AUTO: 500
NITRITE UR QL STRIP.AUTO: NEGATIVE
PH UR STRIP.AUTO: 6.5 [PH]
PROT UR QL STRIP.AUTO: NEGATIVE
RBC #/AREA URNS AUTO: ABNORMAL /HPF
RBC UR QL AUTO: NEGATIVE
SP GR UR STRIP.AUTO: 1.01 (ref 1–1.03)
SQUAMOUS #/AREA URNS LPF: ABNORMAL /HPF
UROBILINOGEN UR STRIP-ACNC: NORMAL
WBC #/AREA URNS AUTO: ABNORMAL /HPF

## 2024-03-18 PROCEDURE — 3079F DIAST BP 80-89 MM HG: CPT | Mod: CPTII,,, | Performed by: FAMILY MEDICINE

## 2024-03-18 PROCEDURE — 99395 PREV VISIT EST AGE 18-39: CPT | Mod: ,,, | Performed by: FAMILY MEDICINE

## 2024-03-18 PROCEDURE — 99213 OFFICE O/P EST LOW 20 MIN: CPT | Mod: 25,,, | Performed by: FAMILY MEDICINE

## 2024-03-18 PROCEDURE — 3008F BODY MASS INDEX DOCD: CPT | Mod: CPTII,,, | Performed by: FAMILY MEDICINE

## 2024-03-18 PROCEDURE — 81001 URINALYSIS AUTO W/SCOPE: CPT | Performed by: FAMILY MEDICINE

## 2024-03-18 PROCEDURE — 3074F SYST BP LT 130 MM HG: CPT | Mod: CPTII,,, | Performed by: FAMILY MEDICINE

## 2024-03-18 PROCEDURE — 3044F HG A1C LEVEL LT 7.0%: CPT | Mod: CPTII,,, | Performed by: FAMILY MEDICINE

## 2024-03-18 PROCEDURE — 87086 URINE CULTURE/COLONY COUNT: CPT | Performed by: FAMILY MEDICINE

## 2024-03-18 PROCEDURE — 1160F RVW MEDS BY RX/DR IN RCRD: CPT | Mod: CPTII,,, | Performed by: FAMILY MEDICINE

## 2024-03-18 PROCEDURE — 1159F MED LIST DOCD IN RCRD: CPT | Mod: CPTII,,, | Performed by: FAMILY MEDICINE

## 2024-03-18 RX ORDER — NITROFURANTOIN 25; 75 MG/1; MG/1
100 CAPSULE ORAL 2 TIMES DAILY
Qty: 10 CAPSULE | Refills: 0 | Status: SHIPPED | OUTPATIENT
Start: 2024-03-18 | End: 2024-03-23

## 2024-03-18 NOTE — PROGRESS NOTES
Patient ID: [id number]     Chief Complaint: Annual Exam (Wellness with lab results) and Abdominal Pain (Pt is c/o lower abd pain x1 week)        HPI:     Areli Loco is a 38 y.o. female here today for an annual wellness visit. Reviewed and discussed lab results. Overall she feels well. No other complaints today. Denies melena, hematochezia, depression. Menses are regular.     As a separate em visit, she c/o lbp with pelvic pressure with some dysuria. NO hematuria, fever, chills, n/v. States this is how her past UTIs started.         ----------------------------  Hypertension     Past Surgical History:   Procedure Laterality Date     SECTION  2021    CHOLECYSTECTOMY      LAPAROSCOPIC LIGATION OF FALLOPIAN TUBE Bilateral 2022    Procedure: LIGATION, FALLOPIAN TUBE, LAPAROSCOPIC;  Surgeon: Edwin Lopez DO;  Location: [facility];  Service: OB/GYN;  Laterality: Bilateral;    NOSE SURGERY      TUBAL LIGATION  2022       Review of patient's allergies indicates:  No Known Allergies    No outpatient medications have been marked as taking for the 3/18/24 encounter (Office Visit) with Katia Renae MD.       Social History     Socioeconomic History    Marital status: Single   Tobacco Use    Smoking status: Never    Smokeless tobacco: Never   Substance and Sexual Activity    Alcohol use: Not Currently    Drug use: Never    Sexual activity: Yes     Partners: Male     Birth control/protection: None   Social History Narrative    ** Merged History Encounter **          Social Determinants of Health     Financial Resource Strain: Low Risk  (3/18/2024)    Overall Financial Resource Strain (CARDIA)     Difficulty of Paying Living Expenses: Not hard at all   Food Insecurity: No Food Insecurity (3/18/2024)    Hunger Vital Sign     Worried About Running Out of Food in the Last Year: Never true     Ran Out of Food in the Last Year: Never true   Transportation Needs: No Transportation Needs  (3/18/2024)    PRAPARE - Transportation     Lack of Transportation (Medical): No     Lack of Transportation (Non-Medical): No   Physical Activity: Inactive (3/18/2024)    Exercise Vital Sign     Days of Exercise per Week: 0 days     Minutes of Exercise per Session: 0 min   Social Connections: Unknown (3/18/2024)    Social Connection and Isolation Panel [NHANES]     Frequency of Communication with Friends and Family: More than three times a week     Frequency of Social Gatherings with Friends and Family: More than three times a week     Marital Status: Never    Housing Stability: Unknown (3/18/2024)    Housing Stability Vital Sign     Unable to Pay for Housing in the Last Year: No     Unstable Housing in the Last Year: No        Family History   Problem Relation Age of Onset    Miscarriages / Stillbirths Mother     Hypertension Father     Arthritis Maternal Grandmother     Diabetes Paternal Grandmother         Patient Care Team:  Katia Renae MD as PCP - General (Family Medicine)       Subjective:     ROS    Constitutional: Denies Change in appetite. Denies Chills. Denies Fever. Denies Night sweats.  Eye: Denies changes in vision  ENT: Denies Decreased hearing. Denies Sore throat. Denies Swollen glands.  Respiratory: Denies Cough. Denies Shortness of breath. Denies Shortness of breath with exertion. Denies Wheezing.  Cardiovascular: DeniesChest pain at rest. Denies Chest pain with exertion. Denies Irregular heartbeat. Denies Palpitations. Denies Edema.  Gastrointestinal: Denies Abdominal pain. DeniesDiarrhea. Denies Nausea. Denies Vomiting. Denies Hematemesis or Hematochezia.  Genitourinary: Denies Dysuria. Denies Urinary frequency. Denies Urinary urgency. Denies Blood in urine.  Endocrine: Denies Cold intolerance. Denies Excessive thirst. Denies Heat intolerance. Denies Weight loss. Denies Weight gain.  Musculoskeletal: Denies Painful joints. Denies Weakness.  Integumentary: Denies Rash. Denies  "Itching. Denies Dry skin.  Neurologic: Denies Dizziness. Denies Fainting. Denies Headache.  Psychiatric: Denies Depression. Denies Anxiety. Denies Suicidal/Homicidal ideations.    All Other ROS: Negative except as stated in HPI.       Objective:     /86 (BP Location: Left arm, Patient Position: Sitting, BP Method: Large (Automatic))   Pulse 87   Temp 98.4 °F (36.9 °C) (Oral)   Resp 18   Ht 5' 6" (1.676 m)   Wt 125.4 kg (276 lb 6.4 oz)   LMP 02/26/2024   SpO2 98%   BMI 44.61 kg/m²     Physical Exam    General: Alert and oriented, No acute distress. Obese  Head: Normocephalic, Atraumatic.  Eye: Pupils are equal, round and reactive to light, Extraocular movements are intact, Sclera non-icteric.  Ears/Nose/Throat: TM+ light reflexes bilaterally. Normal, Mucosa moist,Clear. Teeth intact, no lesions of tongue, palate, mucosa.  Respiratory: Clear to auscultation bilaterally; No wheezes, rales or rhonchi, Non-labored respirations, Symmetrical chest wall expansion.  Cardiovascular: Regular rate and rhythm, S1/S2 normal, No murmurs, rubs or gallops.  Gastrointestinal: Soft, Non-tender, Non-distended, Normal bowel sounds, No palpable organomegaly.  Integumentary: Warm, Dry, Intact, No suspicious lesions or rashes.  Extremities: No clubbing, cyanosis or edema  Psychiatric: Normal interaction, Coherent speech, Euthymic mood, Appropriate affect   Thick white cervical discharge. No cervical lesions or tenderness. No vaginal discharge or lesions. No labial lesions, erythema. (Chaperone present)      Assessment:     Problem List Items Addressed This Visit          Endocrine    Class 3 obesity    Overview     Does not exercise, eats a regular a diet.     - exercise for 30-45 min a day, 5x a week  - eat a total of 1500 calories daily with less than 70 total carbohydrates daily  - use my fitness pal to log foods and track calories  - eat lean meats like chicken, turkey, fish, lean ground beef. Avoid fried, fatty or " processed foods.  - do not eat pasta, bread, rice, potatoes often           Relevant Orders    Lipid Panel    Hemoglobin A1C     Other Visit Diagnoses       Encounter for wellness examination    -  Primary    Relevant Orders    CBC Auto Differential    Comprehensive Metabolic Panel    Lipid Panel    TSH    Hemoglobin A1C    Urinalysis    Hepatitis C Antibody    HIV 1/2 Ag/Ab (4th Gen)    Dysuria        UA today. Will call with results.    Relevant Orders    Urinalysis, Reflex to Urine Culture    Cervical cancer screening        Relevant Orders    Liquid-Based Pap Smear, Screening Screening    Screening for diabetes mellitus        Relevant Orders    Hemoglobin A1C    Encounter for lipid screening for cardiovascular disease        Relevant Orders    Lipid Panel    Need for hepatitis C screening test        Relevant Orders    Hepatitis C Antibody    Encounter for screening for HIV        Relevant Orders    HIV 1/2 Ag/Ab (4th Gen)            Plan:         Health Maintenance Topics with due status: Not Due       Topic Last Completion Date    TETANUS VACCINE 04/19/2020    Hemoglobin A1c (Diabetic Prevention Screening) 03/11/2024        Eye Exam - Recommend annually.    Dental Exam - Recommend biannual exams.     Vaccinations -   Immunization History   Administered Date(s) Administered    COVID-19, MRNA, LN-S, PF (Pfizer) (Gray Cap) 02/09/2022    COVID-19, MRNA, LN-S, PF (Pfizer) (Purple Cap) 01/12/2022    DTaP 1985, 1985, 1985, 01/20/1987, 12/28/1990    HIB 07/20/1997    Hepatitis B 08/14/2000    Hepatitis B, Adolescent/High Risk Infant 02/08/2000, 03/10/2000    Influenza 01/08/2016    Influenza - Quadrivalent - MDCK - PF 09/22/2022    MMR 09/30/1986, 08/16/1991    OPV 1985, 1985, 1985, 01/20/1987, 12/28/1990    Td (ADULT) 02/08/2000    Tdap 12/07/2010, 04/19/2020      Patient was counseled on risks/benefits of receiving immunization. All questions were answered    Perform monthly self  breast exams and call me immediately if you find a lump/bump or have any skin/nipple changes  Exercise for a total of 150 min per week and eat a healthy diet  Stay up to date with all cancer screening discussed in visit  Immunizations due were discussed during visit  All health maintenance was reviewed with patient. Patient verbalized understanding. All questions were answered.            The patient's Health Maintenance was reviewed and the following appears to be due at this time:   Health Maintenance Due   Topic Date Due    Hepatitis C Screening  Never done    Cervical Cancer Screening  Never done    HIV Screening  Never done    COVID-19 Vaccine (3 - 2023-24 season) 09/01/2023         Follow up for 1 year wellness with labs. In addition to their scheduled follow up, the patient has also been instructed to follow up on as needed basis.

## 2024-03-18 NOTE — PROGRESS NOTES
It does appear that patient has a UTI. I sent in macrobid bid x 5 days. Take with food. We will call back once we get final cultures.

## 2024-03-20 LAB
BACTERIA UR CULT: ABNORMAL
BACTERIA UR CULT: ABNORMAL

## 2024-03-21 LAB — PSYCHE PATHOLOGY RESULT: NORMAL

## 2024-03-21 RX ORDER — METRONIDAZOLE 500 MG/1
500 TABLET ORAL EVERY 12 HOURS
Qty: 14 TABLET | Refills: 0 | Status: SHIPPED | OUTPATIENT
Start: 2024-03-21 | End: 2024-03-28

## 2024-03-21 RX ORDER — FLUCONAZOLE 150 MG/1
150 TABLET ORAL DAILY
Qty: 1 TABLET | Refills: 0 | Status: SHIPPED | OUTPATIENT
Start: 2024-03-21 | End: 2024-03-22

## 2024-03-21 NOTE — PROGRESS NOTES
Papsmear normal.     She does have BV and yeast.     She does have BV so I will call in flagyl for it. Do not drink alcohol with medication, take with food and complete course. This is a common bacterial infection that occurs with a shift in the PH of vaginal aurelia. It is not an STD.     I have already sent in diflucan for yeast infection.

## 2024-03-21 NOTE — PROGRESS NOTES
Patient did not have a uti. She did grow a little yeast out in urine so I will send in diflucan. She can stop the antibiotics.

## 2024-04-05 ENCOUNTER — TELEPHONE (OUTPATIENT)
Dept: FAMILY MEDICINE | Facility: CLINIC | Age: 39
End: 2024-04-05
Payer: COMMERCIAL

## 2024-04-05 NOTE — TELEPHONE ENCOUNTER
LM on pts VM request a return call.     I have tried several times to contact pt via phone and sent her a message via the pt portal. I did mail her a letter today. Pap smear results need to be discussed.

## 2024-04-05 NOTE — TELEPHONE ENCOUNTER
----- Message from Zulma Russo sent at 4/5/2024 10:04 AM CDT -----  Type:  Needs Medical Advice    Who Called: pt     Best Call Back Number:  [phone]  Additional Information: pt called stated she is getting multiple msgs about lab results , pt stated she did receive results via  my chart .

## 2024-05-15 ENCOUNTER — OFFICE VISIT (OUTPATIENT)
Dept: FAMILY MEDICINE | Facility: CLINIC | Age: 39
End: 2024-05-15
Payer: COMMERCIAL

## 2024-05-15 VITALS
HEIGHT: 66 IN | WEIGHT: 276.31 LBS | SYSTOLIC BLOOD PRESSURE: 121 MMHG | DIASTOLIC BLOOD PRESSURE: 89 MMHG | BODY MASS INDEX: 44.41 KG/M2 | TEMPERATURE: 98 F | HEART RATE: 82 BPM

## 2024-05-15 DIAGNOSIS — M54.6 PAIN IN THORACIC SPINE: Primary | ICD-10-CM

## 2024-05-15 DIAGNOSIS — M54.9 MID BACK PAIN ON LEFT SIDE: Primary | ICD-10-CM

## 2024-05-15 PROCEDURE — 3079F DIAST BP 80-89 MM HG: CPT | Mod: CPTII,,, | Performed by: FAMILY MEDICINE

## 2024-05-15 PROCEDURE — 1160F RVW MEDS BY RX/DR IN RCRD: CPT | Mod: CPTII,,, | Performed by: FAMILY MEDICINE

## 2024-05-15 PROCEDURE — 99214 OFFICE O/P EST MOD 30 MIN: CPT | Mod: ,,, | Performed by: FAMILY MEDICINE

## 2024-05-15 PROCEDURE — 3074F SYST BP LT 130 MM HG: CPT | Mod: CPTII,,, | Performed by: FAMILY MEDICINE

## 2024-05-15 PROCEDURE — 3008F BODY MASS INDEX DOCD: CPT | Mod: CPTII,,, | Performed by: FAMILY MEDICINE

## 2024-05-15 PROCEDURE — 3044F HG A1C LEVEL LT 7.0%: CPT | Mod: CPTII,,, | Performed by: FAMILY MEDICINE

## 2024-05-15 PROCEDURE — 1159F MED LIST DOCD IN RCRD: CPT | Mod: CPTII,,, | Performed by: FAMILY MEDICINE

## 2024-05-15 NOTE — PROGRESS NOTES
Areli Loco  05/15/2024  [id number]    Katia Renae MD  Patient Care Team:  Katia Renae MD as PCP - General (Family Medicine)      Chief Complaint:  Chief Complaint   Patient presents with    Back Pain     Pt is c/o worsening LBP       History of Present Illness:    38 y.o. female who presents today c/o chronic mid back pain x 4 years with pain worse over left thoracic paraspinus muscles and rhomboids. Denies any neuropathy or paresthesias. Pain is constant and worse with rotation or laying down. She has failed otc meds, muscle relaxers. She went to one PT session then stopped.     Review of Systems  General: denies f/c, weight loss, night sweats, decreased appetite  Eye: denies blurred vision, changes in vision  Respiratory: denies sob, wheezing, cough  Cardiovascular: denies chest pain, palpitations, edema  Gastrointestinal: denies abdominal pain, n/v, constipation, diarrhea  Integumentary: denies rashes, pruritis    Past Medical History  Past Medical History:   Diagnosis Date    Hypertension        Medications      Past Surgical History:   Procedure Laterality Date     SECTION  2021    CHOLECYSTECTOMY      LAPAROSCOPIC LIGATION OF FALLOPIAN TUBE Bilateral 2022    Procedure: LIGATION, FALLOPIAN TUBE, LAPAROSCOPIC;  Surgeon: Edwin Lopez DO;  Location: [facility];  Service: OB/GYN;  Laterality: Bilateral;    NOSE SURGERY      TUBAL LIGATION  2022       SUBJECTIVE:  Health Maintenance  Health Maintenance Topics with due status: Not Due       Topic Last Completion Date    TETANUS VACCINE 2020    Hemoglobin A1c (Diabetic Prevention Screening) 2024    Cervical Cancer Screening 2024     Health Maintenance Due   Topic Date Due    Hepatitis C Screening  Never done    HIV Screening  Never done    COVID-19 Vaccine (3 - 2023-24 season) 2023       Exam:  Vitals:    05/15/24 1006   BP: 121/89   BP Location: Left arm   Patient Position: Sitting   BP  "Method: Large (Automatic)   Pulse: 82   Temp: 98.1 °F (36.7 °C)   TempSrc: Oral   Weight: 125.3 kg (276 lb 4.8 oz)   Height: 5' 6" (1.676 m)     Weight: 125.3 kg (276 lb 4.8 oz)   Body mass index is 44.6 kg/m².      Physical Exam  Constitutional: NAD, alert, pleasant  Respiratory: No accessory muscle use, no cough or audible wheezing  Eyes: EOMI, no conjunctivitis   Integumentary: warm, dry, intact  Psych: AA&Ox3, answers questions appropriately  MSK: pain over thoracic paraspinus muscles and rhomboids. Pain reproduced with lumbar flexion and rotation of trunk      ICD-10-CM ICD-9-CM   1. Mid back pain on left side  M54.9 724.5       1. Mid back pain on left side  -     X-Ray Thoracic Spine AP Lateral; Future; Expected date: 05/15/2024       Xr t-spine followed by mri  Continue muscle relaxers and otc prn nsaids  Discussed getting a massage asap  Back exercises discussed  Follow up: Follow up if symptoms worsen or fail to improve.      Care Plan/Goals: Reviewed   Goals    None               "

## 2024-05-24 ENCOUNTER — HOSPITAL ENCOUNTER (OUTPATIENT)
Dept: RADIOLOGY | Facility: HOSPITAL | Age: 39
Discharge: HOME | End: 2024-05-24
Attending: FAMILY MEDICINE
Payer: COMMERCIAL

## 2024-05-24 DIAGNOSIS — M54.6 PAIN IN THORACIC SPINE: ICD-10-CM

## 2024-05-24 PROCEDURE — 72146 MRI CHEST SPINE W/O DYE: CPT | Mod: TC

## 2024-10-02 ENCOUNTER — TELEPHONE (OUTPATIENT)
Dept: FAMILY MEDICINE | Facility: CLINIC | Age: 39
End: 2024-10-02
Payer: COMMERCIAL

## 2024-10-02 NOTE — TELEPHONE ENCOUNTER
Please call pt to schedule her an appt. She left a message stating that she would like a medication for weight loss. This requires an appt

## 2024-10-02 NOTE — TELEPHONE ENCOUNTER
----- Message from Chantale sent at 10/2/2024  2:37 PM CDT -----  Who Called: Areli Loco    Caller is requesting assistance/information from provider's office.    Symptoms (please be specific):    How long has patient had these symptoms:    List of preferred pharmacies on file (remove unneeded): [unfilled]  If different, enter pharmacy into here including location and phone number:       Preferred Method of Contact: Phone Call  Patient's Preferred Phone Number on File: [phone]   Best Call Back Number, if different:  Additional Information:  Pt is interested in medication for weight loss, please advise.

## 2024-10-24 ENCOUNTER — OFFICE VISIT (OUTPATIENT)
Dept: FAMILY MEDICINE | Facility: CLINIC | Age: 39
End: 2024-10-24
Payer: COMMERCIAL

## 2024-10-24 VITALS
HEIGHT: 66 IN | WEIGHT: 284.81 LBS | SYSTOLIC BLOOD PRESSURE: 119 MMHG | RESPIRATION RATE: 18 BRPM | HEART RATE: 81 BPM | BODY MASS INDEX: 45.77 KG/M2 | DIASTOLIC BLOOD PRESSURE: 82 MMHG | OXYGEN SATURATION: 98 %

## 2024-10-24 DIAGNOSIS — I10 PRIMARY HYPERTENSION: ICD-10-CM

## 2024-10-24 DIAGNOSIS — E66.01 MORBID OBESITY WITH BMI OF 45.0-49.9, ADULT: Primary | ICD-10-CM

## 2024-10-24 DIAGNOSIS — R73.03 PREDIABETES: ICD-10-CM

## 2024-10-24 PROCEDURE — 1159F MED LIST DOCD IN RCRD: CPT | Mod: CPTII,,, | Performed by: FAMILY MEDICINE

## 2024-10-24 PROCEDURE — 3074F SYST BP LT 130 MM HG: CPT | Mod: CPTII,,, | Performed by: FAMILY MEDICINE

## 2024-10-24 PROCEDURE — 99214 OFFICE O/P EST MOD 30 MIN: CPT | Mod: ,,, | Performed by: FAMILY MEDICINE

## 2024-10-24 PROCEDURE — 1160F RVW MEDS BY RX/DR IN RCRD: CPT | Mod: CPTII,,, | Performed by: FAMILY MEDICINE

## 2024-10-24 PROCEDURE — 3079F DIAST BP 80-89 MM HG: CPT | Mod: CPTII,,, | Performed by: FAMILY MEDICINE

## 2024-10-24 PROCEDURE — 3008F BODY MASS INDEX DOCD: CPT | Mod: CPTII,,, | Performed by: FAMILY MEDICINE

## 2024-10-24 PROCEDURE — 3044F HG A1C LEVEL LT 7.0%: CPT | Mod: CPTII,,, | Performed by: FAMILY MEDICINE

## 2024-10-24 RX ORDER — AMLODIPINE BESYLATE 5 MG/1
5 TABLET ORAL DAILY
Qty: 30 TABLET | Refills: 1 | Status: SHIPPED | OUTPATIENT
Start: 2024-10-24 | End: 2025-10-24

## 2024-10-24 NOTE — PROGRESS NOTES
Areli Loco  10/24/2024  [id number]    Katia Renae MD  Patient Care Team:  Katia Renae MD as PCP - General (Family Medicine)      Chief Complaint:  Chief Complaint   Patient presents with    Follow-up     Concerned about weight gain.        History of Present Illness:    39 y.o. female who presents today for obesity concern. She has gained 30 lbs in less than 2 years. Reports she has been eating more than usual. She binges often. Denies depression. States blood pressures at home have been elevated as well. She does have prediabetes.     Review of Systems  General: denies f/c, weight loss, night sweats, decreased appetite  Eye: denies blurred vision, changes in vision  Respiratory: denies sob, wheezing, cough  Cardiovascular: denies chest pain, palpitations, edema  Gastrointestinal: denies abdominal pain, n/v, constipation, diarrhea  Integumentary: denies rashes, pruritis    Past Medical History  Past Medical History:   Diagnosis Date    Hypertension        Medications  Medication List with Changes/Refills   New Medications    AMLODIPINE (NORVASC) 5 MG TABLET    Take 1 tablet (5 mg total) by mouth once daily.       Past Surgical History:   Procedure Laterality Date     SECTION  2021    CHOLECYSTECTOMY      LAPAROSCOPIC LIGATION OF FALLOPIAN TUBE Bilateral 2022    Procedure: LIGATION, FALLOPIAN TUBE, LAPAROSCOPIC;  Surgeon: Edwin Lopez DO;  Location: [facility];  Service: OB/GYN;  Laterality: Bilateral;    NOSE SURGERY      TUBAL LIGATION  2022       SUBJECTIVE:  Health Maintenance  Health Maintenance Topics with due status: Not Due       Topic Last Completion Date    TETANUS VACCINE 2020    Hemoglobin A1c (Diabetic Prevention Screening) 2024    Cervical Cancer Screening 2024    RSV Vaccine (Age 60+ and Pregnant patients) Not Due     Health Maintenance Due   Topic Date Due    Hepatitis C Screening  Never done    HIV Screening  Never done     "Influenza Vaccine (1) 09/01/2024    COVID-19 Vaccine (3 - 2024-25 season) 09/01/2024       Exam:  Vitals:    10/24/24 1516   BP: (!) 120/90   BP Location: Right forearm   Patient Position: Sitting   Pulse: 81   Resp: 18   SpO2: 98%   Weight: 129.2 kg (284 lb 12.8 oz)   Height: 5' 6" (1.676 m)     Weight: 129.2 kg (284 lb 12.8 oz)   Body mass index is 45.97 kg/m².      Physical Exam  Constitutional: NAD, alert, pleasant, obese  Respiratory: CTAB, no wheezes, rales or rhonchi. No accessory muscle use  Eyes: EOMI  Cardiovascular: RRR, No m/r/g. No JVD. No LE edema  Gastrointestinal: BS+, nontender, nondistended  Integumentary: warm, dry, intact  Psych: AA&Ox3      ICD-10-CM ICD-9-CM   1. Morbid obesity with BMI of 45.0-49.9, adult  E66.01 278.01    Z68.42 V85.42   2. Primary hypertension  I10 401.9   3. Prediabetes  R73.03 790.29       1. Morbid obesity with BMI of 45.0-49.9, adult  -     CBC Auto Differential; Future; Expected date: 10/24/2024  -     Comprehensive Metabolic Panel; Future; Expected date: 10/24/2024  -     TSH; Future; Expected date: 10/24/2024  -     Hemoglobin A1C; Future; Expected date: 10/24/2024  -     Ambulatory referral/consult to Bariatric/Obesity Medicine; Future; Expected date: 10/24/2024  -     Insulin, Random; Future; Expected date: 10/24/2024  -     Testosterone, Total, LC/MS/MS; Future; Expected date: 10/24/2024    2. Primary hypertension  -     amLODIPine (NORVASC) 5 MG tablet; Take 1 tablet (5 mg total) by mouth once daily.  Dispense: 30 tablet; Refill: 1    3. Prediabetes  -     Insulin, Random; Future; Expected date: 10/24/2024  -     Testosterone, Total, LC/MS/MS; Future; Expected date: 10/24/2024       Start amlodipine 5 mg daily  Labs ordered  Check insulin and testosterone  Consider zepbound  Refer to obesity medicine at this time  - exercise for 30-45 min a day, 5x a week  - eat a total of 1500 calories daily with less than 70 total carbohydrates daily  - use my fitness pal to log " foods and track calories  - eat lean meats like chicken, turkey, fish, lean ground beef. Avoid fried, fatty or processed foods.  - do not eat pasta, bread, rice, potatoes often      Follow up: Follow up for 4 wks htn and obesity.      Care Plan/Goals: Reviewed   Goals    None

## 2024-10-25 ENCOUNTER — LAB VISIT (OUTPATIENT)
Dept: LAB | Facility: HOSPITAL | Age: 39
End: 2024-10-25
Attending: FAMILY MEDICINE
Payer: COMMERCIAL

## 2024-10-25 DIAGNOSIS — E66.01 MORBID OBESITY WITH BMI OF 45.0-49.9, ADULT: ICD-10-CM

## 2024-10-25 DIAGNOSIS — R73.03 PREDIABETES: ICD-10-CM

## 2024-10-25 LAB
ALBUMIN SERPL-MCNC: 3.5 G/DL (ref 3.5–5)
ALBUMIN/GLOB SERPL: 1.1 RATIO (ref 1.1–2)
ALP SERPL-CCNC: 73 UNIT/L (ref 40–150)
ALT SERPL-CCNC: 8 UNIT/L (ref 0–55)
ANION GAP SERPL CALC-SCNC: 8 MEQ/L
AST SERPL-CCNC: 12 UNIT/L (ref 5–34)
BASOPHILS # BLD AUTO: 0.03 X10(3)/MCL
BASOPHILS NFR BLD AUTO: 0.3 %
BILIRUB SERPL-MCNC: 0.8 MG/DL
BUN SERPL-MCNC: 10.7 MG/DL (ref 7–18.7)
CALCIUM SERPL-MCNC: 8.9 MG/DL (ref 8.4–10.2)
CHLORIDE SERPL-SCNC: 108 MMOL/L (ref 98–107)
CO2 SERPL-SCNC: 24 MMOL/L (ref 22–29)
CREAT SERPL-MCNC: 0.85 MG/DL (ref 0.55–1.02)
CREAT/UREA NIT SERPL: 13
EOSINOPHIL # BLD AUTO: 0.2 X10(3)/MCL (ref 0–0.9)
EOSINOPHIL NFR BLD AUTO: 2.2 %
ERYTHROCYTE [DISTWIDTH] IN BLOOD BY AUTOMATED COUNT: 14.6 % (ref 11.5–17)
EST. AVERAGE GLUCOSE BLD GHB EST-MCNC: 114 MG/DL
GFR SERPLBLD CREATININE-BSD FMLA CKD-EPI: >60 ML/MIN/1.73/M2
GLOBULIN SER-MCNC: 3.2 GM/DL (ref 2.4–3.5)
GLUCOSE SERPL-MCNC: 89 MG/DL (ref 74–100)
HBA1C MFR BLD: 5.6 %
HCT VFR BLD AUTO: 38.1 % (ref 37–47)
HGB BLD-MCNC: 12 G/DL (ref 12–16)
IMM GRANULOCYTES # BLD AUTO: 0.02 X10(3)/MCL (ref 0–0.04)
IMM GRANULOCYTES NFR BLD AUTO: 0.2 %
INSULIN SERPL-MCNC: 12.3 UU/ML
LYMPHOCYTES # BLD AUTO: 2.53 X10(3)/MCL (ref 0.6–4.6)
LYMPHOCYTES NFR BLD AUTO: 28 %
MCH RBC QN AUTO: 25.4 PG (ref 27–31)
MCHC RBC AUTO-ENTMCNC: 31.5 G/DL (ref 33–36)
MCV RBC AUTO: 80.5 FL (ref 80–94)
MONOCYTES # BLD AUTO: 0.55 X10(3)/MCL (ref 0.1–1.3)
MONOCYTES NFR BLD AUTO: 6.1 %
NEUTROPHILS # BLD AUTO: 5.72 X10(3)/MCL (ref 2.1–9.2)
NEUTROPHILS NFR BLD AUTO: 63.2 %
NRBC BLD AUTO-RTO: 0 %
PLATELET # BLD AUTO: 329 X10(3)/MCL (ref 130–400)
PMV BLD AUTO: 10 FL (ref 7.4–10.4)
POTASSIUM SERPL-SCNC: 4.4 MMOL/L (ref 3.5–5.1)
PROT SERPL-MCNC: 6.7 GM/DL (ref 6.4–8.3)
RBC # BLD AUTO: 4.73 X10(6)/MCL (ref 4.2–5.4)
SODIUM SERPL-SCNC: 140 MMOL/L (ref 136–145)
TSH SERPL-ACNC: 1.37 UIU/ML (ref 0.35–4.94)
WBC # BLD AUTO: 9.05 X10(3)/MCL (ref 4.5–11.5)

## 2024-10-25 PROCEDURE — 36415 COLL VENOUS BLD VENIPUNCTURE: CPT

## 2024-10-25 PROCEDURE — 80053 COMPREHEN METABOLIC PANEL: CPT

## 2024-10-25 PROCEDURE — 83036 HEMOGLOBIN GLYCOSYLATED A1C: CPT

## 2024-10-25 PROCEDURE — 85025 COMPLETE CBC W/AUTO DIFF WBC: CPT

## 2024-10-25 PROCEDURE — 83525 ASSAY OF INSULIN: CPT

## 2024-10-25 PROCEDURE — 84443 ASSAY THYROID STIM HORMONE: CPT

## 2024-10-29 ENCOUNTER — LAB VISIT (OUTPATIENT)
Dept: LAB | Facility: HOSPITAL | Age: 39
End: 2024-10-29
Attending: FAMILY MEDICINE
Payer: COMMERCIAL

## 2024-10-29 DIAGNOSIS — R73.03 PREDIABETES: ICD-10-CM

## 2024-10-29 DIAGNOSIS — E66.01 MORBID OBESITY WITH BMI OF 45.0-49.9, ADULT: ICD-10-CM

## 2024-10-29 PROCEDURE — 84403 ASSAY OF TOTAL TESTOSTERONE: CPT

## 2024-10-29 PROCEDURE — 36415 COLL VENOUS BLD VENIPUNCTURE: CPT

## 2024-11-01 LAB — TESTOST SERPL-MCNC: 12 NG/DL

## 2024-11-21 ENCOUNTER — OFFICE VISIT (OUTPATIENT)
Dept: FAMILY MEDICINE | Facility: CLINIC | Age: 39
End: 2024-11-21
Payer: COMMERCIAL

## 2024-11-21 VITALS
BODY MASS INDEX: 45.29 KG/M2 | SYSTOLIC BLOOD PRESSURE: 113 MMHG | DIASTOLIC BLOOD PRESSURE: 81 MMHG | WEIGHT: 281.81 LBS | TEMPERATURE: 99 F | HEIGHT: 66 IN | OXYGEN SATURATION: 98 % | HEART RATE: 88 BPM | RESPIRATION RATE: 18 BRPM

## 2024-11-21 DIAGNOSIS — I10 PRIMARY HYPERTENSION: Primary | ICD-10-CM

## 2024-11-21 PROCEDURE — 3044F HG A1C LEVEL LT 7.0%: CPT | Mod: CPTII,,, | Performed by: FAMILY MEDICINE

## 2024-11-21 PROCEDURE — 1160F RVW MEDS BY RX/DR IN RCRD: CPT | Mod: CPTII,,, | Performed by: FAMILY MEDICINE

## 2024-11-21 PROCEDURE — 99214 OFFICE O/P EST MOD 30 MIN: CPT | Mod: ,,, | Performed by: FAMILY MEDICINE

## 2024-11-21 PROCEDURE — 3079F DIAST BP 80-89 MM HG: CPT | Mod: CPTII,,, | Performed by: FAMILY MEDICINE

## 2024-11-21 PROCEDURE — 3074F SYST BP LT 130 MM HG: CPT | Mod: CPTII,,, | Performed by: FAMILY MEDICINE

## 2024-11-21 PROCEDURE — 1159F MED LIST DOCD IN RCRD: CPT | Mod: CPTII,,, | Performed by: FAMILY MEDICINE

## 2024-11-21 PROCEDURE — 3008F BODY MASS INDEX DOCD: CPT | Mod: CPTII,,, | Performed by: FAMILY MEDICINE

## 2024-11-21 RX ORDER — AMLODIPINE BESYLATE 5 MG/1
5 TABLET ORAL DAILY
Qty: 90 TABLET | Refills: 0 | Status: SHIPPED | OUTPATIENT
Start: 2024-11-21 | End: 2025-11-21

## 2024-11-21 NOTE — PROGRESS NOTES
Areli Loco  2024  [id number]    Katia Renae MD  Patient Care Team:  Katia Renae MD as PCP - General (Family Medicine)      Chief Complaint:  Chief Complaint   Patient presents with    Follow-up     4 week f/u for HTN and Obesity       History of Present Illness:    39 y.o. female who presents today for htn f/u. Started on amlodipine 5 mg at last visit. Doing well. BP at goal at home as well. No side effects. Has lost a few lbs. Will be seeing obesity medicine clinic next week in an attempt to lose weight.     Review of Systems  General: denies f/c, weight loss, night sweats, decreased appetite  Eye: denies blurred vision, changes in vision  Respiratory: denies sob, wheezing, cough  Cardiovascular: denies chest pain, palpitations, edema  Gastrointestinal: denies abdominal pain, n/v, constipation, diarrhea  Integumentary: denies rashes, pruritis    Past Medical History  Past Medical History:   Diagnosis Date    Hypertension        Medications  Medication List with Changes/Refills   Changed and/or Refilled Medications    Modified Medication Previous Medication    AMLODIPINE (NORVASC) 5 MG TABLET amLODIPine (NORVASC) 5 MG tablet       Take 1 tablet (5 mg total) by mouth once daily.    Take 1 tablet (5 mg total) by mouth once daily.       Past Surgical History:   Procedure Laterality Date     SECTION  2021    CHOLECYSTECTOMY      LAPAROSCOPIC LIGATION OF FALLOPIAN TUBE Bilateral 2022    Procedure: LIGATION, FALLOPIAN TUBE, LAPAROSCOPIC;  Surgeon: Edwin Lopez DO;  Location: [facility];  Service: OB/GYN;  Laterality: Bilateral;    NOSE SURGERY      TUBAL LIGATION  2022       SUBJECTIVE:  Health Maintenance  Health Maintenance Topics with due status: Not Due       Topic Last Completion Date    TETANUS VACCINE 2020    Cervical Cancer Screening 2024    Hemoglobin A1c (Diabetic Prevention Screening) 10/25/2024    RSV Vaccine (Age 60+ and  "Pregnant patients) Not Due     Health Maintenance Due   Topic Date Due    Hepatitis C Screening  Never done    HIV Screening  Never done    COVID-19 Vaccine (3 - 2024-25 season) 09/01/2024       Exam:  Vitals:    11/21/24 1052   BP: 113/81   BP Location: Left arm   Patient Position: Sitting   Pulse: 88   Resp: 18   Temp: 98.5 °F (36.9 °C)   TempSrc: Oral   SpO2: 98%   Weight: 127.8 kg (281 lb 12.8 oz)   Height: 5' 6" (1.676 m)     Weight: 127.8 kg (281 lb 12.8 oz)   Body mass index is 45.48 kg/m².      Physical Exam  Constitutional: NAD, alert, pleasant  Respiratory:  No accessory muscle use  Eyes: EOMI  Cardiovascular: RRR, No m/r/g.  Integumentary: warm, dry, intact  Psych: AA&Ox3      ICD-10-CM ICD-9-CM   1. Primary hypertension  I10 401.9       1. Primary hypertension  Overview:  Bp well controlled with amlodipine 5 mg daily. Asymptomatic    Continue current Rx meds  Refill amlodipine  Recommend a low salt diet, weight loss and exercise  Avoid drinking too much caffeine  Take blood pressure medications 2-3 hours BEFORE every appointment so we can get an accurate reading in the office  Check your blood pressure twice a day and write it down. Bring blood pressure log and blood pressure cuff to next visit  Call me with pressure more than 140/90 or less than 100/60      Orders:  -     amLODIPine (NORVASC) 5 MG tablet; Take 1 tablet (5 mg total) by mouth once daily.  Dispense: 90 tablet; Refill: 0         Follow up: Follow up for 3 mts htn.      Care Plan/Goals: Reviewed   Goals    None               "

## 2024-11-25 NOTE — PROGRESS NOTES
Areli Loco    Chief Complaint   Patient presents with    Obesity     Nonsurgical weight loss consult       HPI: Ms. Areli Loco is a 39 y.o. female in clinic today for non surgical weight loss. Pt here to discuss weight management and possibly weight loss medication.     Lowest adult weight: 180#, 2013. Increase in stress and after 4th child weight has increased.   Diet: processed snack foods, meal skipper, does not eat breakfast, eats late.   Exercise: no dedicated exercise.   Previous wt loss meds:    Adipex: palpitations, it did work    Metformin: never tried   Contrave: never tried   Qsymia: never tried   GLPs: unsure of what she took     Labs (10/25/24): A1c: 5.6%.     HT: 56in   Weights  NSWL Consult (2024): 284#  BMI: 45    Pertinent History:  HTN - [x] Yes   [] No  HLD - [] Yes   [x] No  DM  -  [] Yes   [x] No  LORENA - [] Yes   [x] No  GERD - [] Yes   [x] No  Anticoagulation- [] Yes   [x] No    Patient Care Team:  Katia Renae MD as PCP - General (Family Medicine)     Subjective:     12 point review of systems conducted, negative except as stated in the history of present illness. See HPI for details.    Review of patient's allergies indicates:  No Known Allergies  Past Medical History:   Diagnosis Date    Arthritis     Lower back for a while now    Hypertension      Past Surgical History:   Procedure Laterality Date     SECTION  2021    CHOLECYSTECTOMY      LAPAROSCOPIC LIGATION OF FALLOPIAN TUBE Bilateral 2022    Procedure: LIGATION, FALLOPIAN TUBE, LAPAROSCOPIC;  Surgeon: Edwin Lopez DO;  Location: [facility];  Service: OB/GYN;  Laterality: Bilateral;    NOSE SURGERY      TUBAL LIGATION  2022     Family History   Problem Relation Name Age of Onset    Miscarriages / Stillbirths Mother Yolanda     Hypertension Father Jose David     Stroke Father Jose David     Arthritis Maternal Grandmother Hannah     Diabetes Paternal Grandmother Ifeoma      Social History  "    Tobacco Use    Smoking status: Never    Smokeless tobacco: Never   Substance Use Topics    Alcohol use: Not Currently    Drug use: Never      Current Outpatient Medications   Medication Instructions    amLODIPine (NORVASC) 5 mg, Oral, Daily       Objective:     Vital Signs (Most Recent):  Visit Vitals  BP (!) 136/96 (BP Location: Left arm, Patient Position: Sitting)   Pulse 76   Ht 5' 6" (1.676 m)   Wt 128.8 kg (284 lb)   LMP 11/26/2024   BMI 45.84 kg/m²     Physical Exam:  General:  Alert and oriented.    Respiratory:  Lungs are clear to auscultation, Respirations are non-labored, Breath sounds are equal.    Cardiovascular:  Normal rate, Regular rhythm, No murmur.    Gastrointestinal:  Soft, Non-tender, Non-distended, Normal bowel sounds        Musculoskeletal:  Normal range of motion, Normal strength.    Integumentary:  Warm, Dry, Pink.    Neurologic:  Alert, Oriented.    Psychiatric:  Cooperative.      Assessment:         ICD-10-CM ICD-9-CM   1. Encounter for weight loss counseling  Z71.3 V65.3   2. Morbid obesity with BMI of 45.0-49.9, adult  E66.01 278.01    Z68.42 V85.42   3. Encounter for pre-bariatric surgery counseling and education  Z71.89 V65.49   4. Dietary counseling  Z71.3 V65.3   5. Exercise counseling  Z71.82 V65.41   6. Morbid obesity  E66.01 278.01        Plan:     1. Encounter for weight loss counseling    2. Morbid obesity with BMI of 45.0-49.9, adult  -     Ambulatory referral/consult to Bariatric/Obesity Medicine    3. Encounter for pre-bariatric surgery counseling and education    4. Dietary counseling    5. Exercise counseling    6. Morbid obesity         Plan:  Diet:   Exercise:   Increase daily physical activity   Discussed types of weight loss medications, risks/benefits of these medications.   Dr. Cristobal to call in Adipex 37.5mg tab  1/2 tab for 7 days and then full tab thereafter  #30  Discussed importance of monitoring blood pressure and heart rate at home and if BP greater than " 150/90 or heart rate greater than 120 bpm, recommend immediate discontinuation of this medication. Discussed other possible side effects of this medication that would require immediate discontinuation. Patient verbalized understanding.   Metformin 500mg tab  I po in am x's 7 days and then I tab PO BID  #60  NR  Discussed GLP-1 with patient and if she does not have coverage is interested in the compounded medication   Discussed plan of care with medical bariatrician Dr. Antoine Cristobal and he was in agreement with proposed treatment plan.   Keep routine scheduled appointments with PCP/specialists.   FU 1 month

## 2024-11-26 ENCOUNTER — CLINICAL SUPPORT (OUTPATIENT)
Dept: BARIATRICS | Facility: HOSPITAL | Age: 39
End: 2024-11-26

## 2024-11-26 ENCOUNTER — OFFICE VISIT (OUTPATIENT)
Dept: SURGERY | Facility: CLINIC | Age: 39
End: 2024-11-26

## 2024-11-26 VITALS
BODY MASS INDEX: 45.84 KG/M2 | HEIGHT: 66 IN | HEART RATE: 76 BPM | DIASTOLIC BLOOD PRESSURE: 96 MMHG | WEIGHT: 284 LBS | WEIGHT: 284 LBS | BODY MASS INDEX: 45.64 KG/M2 | SYSTOLIC BLOOD PRESSURE: 136 MMHG

## 2024-11-26 DIAGNOSIS — Z71.3 ENCOUNTER FOR WEIGHT LOSS COUNSELING: Primary | ICD-10-CM

## 2024-11-26 DIAGNOSIS — E66.01 MORBID OBESITY WITH BMI OF 45.0-49.9, ADULT: ICD-10-CM

## 2024-11-26 DIAGNOSIS — Z71.3 DIETARY COUNSELING: ICD-10-CM

## 2024-11-26 DIAGNOSIS — E66.9 OBESITY, UNSPECIFIED CLASS, UNSPECIFIED OBESITY TYPE, UNSPECIFIED WHETHER SERIOUS COMORBIDITY PRESENT: Primary | ICD-10-CM

## 2024-11-26 DIAGNOSIS — E66.9 OBESITY: Primary | ICD-10-CM

## 2024-11-26 DIAGNOSIS — Z71.89 ENCOUNTER FOR PRE-BARIATRIC SURGERY COUNSELING AND EDUCATION: ICD-10-CM

## 2024-11-26 DIAGNOSIS — E66.01 MORBID OBESITY: ICD-10-CM

## 2024-11-26 DIAGNOSIS — Z71.82 EXERCISE COUNSELING: ICD-10-CM

## 2024-11-26 PROCEDURE — 94200023 HC BARIATRIC CONSULT - 60 MINS

## 2024-11-26 NOTE — PROGRESS NOTES
.G[facility]    BEHAVIOR MODIFICATION AND EXERCISE CONSULT    PERSONAL:     What initiated your interest in the nonsurgical weight loss program?  Referred by Dr. Sugar Stnaford    Who is your social or relational support? spouse    Do you work? 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 some    Were there any particular events that lead to significant weight gain? []Loss of a loved one  [x]Pregnancy  []Trauma, accident, illness  []Loss of employment []Other    PHYSICAL ACTIVITY:    Do you currently exercise: []Yes   [x]No    If so, please describe:    Have you experienced any injuries and/or restrictions that may limit your physical activity? []Yes   [x]No    If so, please describe:     BEHAVIORS:    What behavior(s) would you like to change in order to be healthy? Eat healthier, doesn't eat breakfast    On a scale of 1-10 (1-extremely low, 10-extremely high), how motivated are you to change your behavior(s)? 9    Do you currently use any type of tobacco products (vape, dip, cigarettes, etc.) No    If yes, on average, how many and/or how often do you use these products on a daily basis?    How many hours of sleep do you average? 5    Rate your stress level on a scale of 1-10 (1-extremely low, 10-extremely high) 7    What is your biggest life stressor? Financial     How do you cope and/or manage stress? eat    Is your appetite affected by stress, boredom, depression, etc.? stress    Have you ever seen a counselor or therapist? no      CURRENT WEIGHT: 284 HIGHEST WEIGHT: 284 LOWEST ADULT WEIGHT: 180 GOAL WEIGHT: 215-220      HANDOUTS: STRESS MANAGEMENT, MANAGING FOOD CRAVINGS, WAYS TO REPLACE EMOTIONAL EATING    NOTES: BODY COMPOSITION WAS CONDUCTED AND PATIENT WAS EDUCATED ON RESULTS. SEE WEIGHTLOSS PLAN.       Abbey Gil, WOODY, CPT, CHC

## 2024-11-26 NOTE — PROGRESS NOTES
"NUTRITIONAL CONSULT  Non-surgical weight loss      PAST HISTORY:   Dieting attempts include Diet supplements/ medication Adipex/ Phentermine  Highest adult weight: 285# current   How many years at present wt: 2 years  Greatest single wt loss: 70# with Adipex    CLINICAL DATA:  Height:   Ht Readings from Last 1 Encounters:   24 5' 6" (1.676 m)      Weight:   Wt Readings from Last 3 Encounters:   24 1001 128.8 kg (284 lb)   24 1052 127.8 kg (281 lb 12.8 oz)   10/24/24 1516 129.2 kg (284 lb 12.8 oz)      IBW: 130 lbs   BMI:   BMI Readings from Last 1 Encounters:   24 45.84 kg/m²      Patient's goal weight: 215-220 lbs    FAMILY HISTORY OF OBESITY:  Yes           WHAT SIDE OF THE FAMILY?   []Mother   []Father   []Sibling   []Child     [x]Extended    []Adopted     NUTRITION & HEALTH HISTORY:  Greatest challenge: dining out frequency, starchy CHO, snacking at night, and high-fat diet- doordashes daily for work    Current diet recall:   Breakfast: skipped  Lunch: spicy chicken with fries and lemonade  Dinner: shrimp poboy with homemade fries beer   Drink: beer    Current Dietary Patterns:  Meal pattern: 2  Snackin-3 / day Type: cookies, sweets before bed or at work snack on it  Vegetables: Likes a few. Eats 2-3 times per week.  Fruits: Likes none. Eats rarely.  Beverages: water, soda, and sugary beverages  Alcohol consumption: Weekly. Type: beer, wine cooler or daiquiri   Dining out:  Weekly. Mostly fast food and restaurants.  Grocery shopping and food prep: Yes[]Self   [x]Spouse   []Other:   Emotional eating: Yes Which emotions if yes: stress  Nighttime snacking: Yes Middle of night: No Before bedtime: Yes  Hx of disordered eating behaviors: No Anorexia: No Bulimia: No Purging: No Binging:No  History of vitamin/mineral deficiencies:   No    Vitamins / Minerals / Herbs:   Vit D, [place] MVI, Folic acid vitamin    Food Allergies:   none    ASSESSMENT:  Patient reports attempts at weight loss, " only to regain lost weight.  Patient demonstrated knowledge of healthy eating behaviors and exercise patterns; admits to not eating healthy and not exercising at this point.  Patient states willingness to change lifestyle and make behavior modifications.    ESTIMATED NEEDS: 74 kg  Calories: 3016-2421 (20-25 kcal/kg adjusted BW/d)  Protein: 74-81 (1.0-1.1 g/kg adjusted BW/d)  Fluid:  1480 (20 mL/kg actual BW/d)    PLAN:  Try to bring lunch to work or use Fueling on the go packet instead of eating doordash daily at work  Increase water intake- aim for  oz  Limit sodas to once daily  Eat protein every 3-4 hours to avoid unnecessary snacking during day on sweets  Use protein snack list to keep better snack options around at work  Try premier protein shake in morning instead of skipping breakfast

## 2024-11-28 RX ORDER — METFORMIN HYDROCHLORIDE 500 MG/1
TABLET ORAL
Qty: 60 TABLET | Refills: 0 | Status: SHIPPED | OUTPATIENT
Start: 2024-11-28 | End: 2024-12-28

## 2024-11-28 RX ORDER — PHENTERMINE HYDROCHLORIDE 37.5 MG/1
TABLET ORAL
Qty: 30 TABLET | Refills: 0 | Status: SHIPPED | OUTPATIENT
Start: 2024-11-28

## 2024-12-18 NOTE — PROGRESS NOTES
Areli Loco    Chief Complaint   Patient presents with    Obesity     Nonsurgical weight loss follow-up       HPI: Ms. Areli Loco is a 39 y.o. female in clinic today for non surgical weight loss. Pt here to discuss weight management and possibly weight loss medication.      Lowest adult weight: 180#, 2013. Increase in stress and after 4th child weight has increased.     Previous wt loss meds:               Adipex: palpitations, it did work               Metformin: never tried              Contrave: never tried              Qsymia: never tried              GLPs: unsure of what she took     Diet: off of soda, decreased portion size. Less processed foods, more protein  Exercise: attempts but could increase.      Labs (10/25/24): A1c: 5.6%.      HT: 56in   Weights  NSWL Consult (2024): 284#                BMI: 45  NSWL Weight (24): 276#  BMI: 44     Pertinent History:  HTN - [x] Yes   [] No  HLD - [] Yes   [x] No  DM  -  [] Yes   [x] No  LORENA - [] Yes   [x] No  GERD - [] Yes   [x] No  Anticoagulation- [] Yes   [x] No    Patient Care Team:  Katia Renae MD as PCP - General (Family Medicine)     Subjective:     12 point review of systems conducted, negative except as stated in the history of present illness. See HPI for details.    Review of patient's allergies indicates:  No Known Allergies  Past Medical History:   Diagnosis Date    Arthritis     Lower back for a while now    Hypertension      Past Surgical History:   Procedure Laterality Date     SECTION  2021    CHOLECYSTECTOMY      LAPAROSCOPIC LIGATION OF FALLOPIAN TUBE Bilateral 2022    Procedure: LIGATION, FALLOPIAN TUBE, LAPAROSCOPIC;  Surgeon: Edwin Lopez DO;  [facility]: [facility];  Service: OB/GYN;  Laterality: Bilateral;    NOSE SURGERY      TUBAL LIGATION  2022     Family History   Problem Relation Name Age of Onset    Miscarriages / Stillbirths Mother Yolanda     Hyperlipidemia Mother  "Yolanda     Hypertension Father Jose David     Stroke Father Jose David     Arthritis Maternal Grandmother Hannah     Diabetes Paternal Grandmother Ifeoma      Social History     Tobacco Use    Smoking status: Never    Smokeless tobacco: Never   Substance Use Topics    Alcohol use: Not Currently    Drug use: Never      Current Outpatient Medications   Medication Instructions    alcohol swabs (ALCOHOL PREP PADS) PadM 1 each, Topical (Top), Weekly    amLODIPine (NORVASC) 5 mg, Oral, Daily    insulin syringes, disposable, 1 mL Syrg 8 Syringes, Misc.(Non-Drug; Combo Route), Weekly    metFORMIN (GLUCOPHAGE) 500 MG tablet Take 1 tablet (500 mg total) by mouth daily with breakfast for 7 days, THEN 1 tablet (500 mg total) 2 (two) times daily with meals for 23 days.    phentermine (ADIPEX-P) 37.5 mg tablet Take 1/2 tab for 7 days and then full tab thereafter.    semaglutide (OZEMPIC) 0.25 mg or 0.5 mg (2 mg/3 mL) pen injector Compounded semaglutide 2mg/ml. Injection 12.5 Units Sub-Q once weekly. Disp 1 vial. (8 doses/vial). Bill to patient. Pt to .       Objective:     Vital Signs (Most Recent):  Visit Vitals  /86 (BP Location: Left arm, Patient Position: Sitting)   Pulse 84   Ht 5' 6" (1.676 m)   Wt 125.5 kg (276 lb 11.2 oz)   LMP 11/26/2024   BMI 44.66 kg/m²       Physical Exam:  General:  Alert and oriented.    Respiratory:  Lungs are clear to auscultation, Respirations are non-labored, Breath sounds are equal.    Cardiovascular:  Normal rate, Regular rhythm, No murmur.    Gastrointestinal:  Soft, Non-tender, Non-distended, Normal bowel sounds        Musculoskeletal:  Normal range of motion, Normal strength.    Integumentary:  Warm, Dry, Pink.    Neurologic:  Alert, Oriented.    Psychiatric:  Cooperative.      Assessment:         ICD-10-CM ICD-9-CM   1. Encounter for weight loss counseling  Z71.3 V65.3   2. Exercise counseling  Z71.82 V65.41   3. Morbid obesity  E66.01 278.01   4. Dietary counseling  Z71.3 V65.3        Plan: "     1. Encounter for weight loss counseling    2. Exercise counseling    3. Morbid obesity    4. Dietary counseling    Other orders  -     semaglutide (OZEMPIC) 0.25 mg or 0.5 mg (2 mg/3 mL) pen injector; Compounded semaglutide 2mg/ml. Injection 12.5 Units Sub-Q once weekly. Disp 1 vial. (8 doses/vial). Bill to patient. Pt to .  Dispense: 1 mL; Refill: 0  -     insulin syringes, disposable, 1 mL Syrg; 8 Syringes by Misc.(Non-Drug; Combo Route) route once a week. for 8 doses  -     alcohol swabs (ALCOHOL PREP PADS) PadM; Apply 1 each topically once a week. for 40 doses  Dispense: 40 each; Refill: 0         Plan:  Diet:   Increase protein this month  Meal consistency  Exercise:   Increase daily physical activity   Discussed types of weight loss medications, risks/benefits of these medications.   semaglutide (OZEMPIC) 0.25 mg or 0.5 mg (2 mg/3 mL) pen injector; Compounded semaglutide 2mg/ml. Injection 12.5 Units Sub-Q once weekly. Disp 1 vial. (8 doses/vial). Bill to patient. Pt to .  Dispense: 1 mL; Refill: 0  Discussed with patient the risks of   Muscle wasting  Exercise plan discussed with patient. Need weight bearing joint activity to keep lean muscle mass.   Dehydration  Needs to remain very hydrated with 73- 100oz water daily.  Possible headache on injection day, but again stay very hydrated.   Kidney injury due to dehydration  Pancreatitis  Hypoglycemia or low blood sugar episode  Nausea  Decrease meal portion size to avoid nausea.   Avoid fried/fatty meals  Avoid meals high in sugar or sugary drinks  Thyroid tumor  Pt will self check thyroid as well as I in clinic monthly and will notify office if they feels any new nodules.   Avoid Pregnancy   Discussed avoiding pregnancy with women of child bearing age. Risks of pregnancy on this medication are unknown. Discuss with OB different types of contraceptive methods and what would work best for you and your family.   Pt verbalized  understanding  Discussed plan of care with medical bariatrician Dr. Antoine Cristobal and he was in agreement with proposed treatment plan.   Keep routine scheduled appointments with PCP/specialists.   FU 1 month

## 2024-12-20 ENCOUNTER — CLINICAL SUPPORT (OUTPATIENT)
Dept: BARIATRICS | Facility: HOSPITAL | Age: 39
End: 2024-12-20

## 2024-12-20 ENCOUNTER — OFFICE VISIT (OUTPATIENT)
Dept: SURGERY | Facility: CLINIC | Age: 39
End: 2024-12-20

## 2024-12-20 VITALS
SYSTOLIC BLOOD PRESSURE: 132 MMHG | DIASTOLIC BLOOD PRESSURE: 86 MMHG | HEIGHT: 66 IN | BODY MASS INDEX: 44.47 KG/M2 | WEIGHT: 276.69 LBS | HEART RATE: 84 BPM

## 2024-12-20 DIAGNOSIS — E66.01 MORBID OBESITY WITH BMI OF 40.0-44.9, ADULT: Primary | ICD-10-CM

## 2024-12-20 DIAGNOSIS — Z71.3 ENCOUNTER FOR WEIGHT LOSS COUNSELING: Primary | ICD-10-CM

## 2024-12-20 DIAGNOSIS — E66.9 OBESITY: Primary | ICD-10-CM

## 2024-12-20 DIAGNOSIS — Z71.82 EXERCISE COUNSELING: ICD-10-CM

## 2024-12-20 DIAGNOSIS — Z71.3 DIETARY COUNSELING: ICD-10-CM

## 2024-12-20 DIAGNOSIS — E66.01 MORBID OBESITY: ICD-10-CM

## 2024-12-20 PROCEDURE — 94200023 HC BARIATRIC CONSULT - 60 MINS

## 2024-12-20 RX ORDER — ISOPROPYL ALCOHOL 70 ML/100ML
1 SWAB TOPICAL WEEKLY
Qty: 40 EACH | Refills: 0 | Status: SHIPPED | OUTPATIENT
Start: 2024-12-20 | End: 2025-09-20

## 2024-12-20 NOTE — PROGRESS NOTES
A nonsurgical medically supervised weight loss visit was conducted today.  Patient's weight is 276.7 lbs. Patient has lost 7 lbs. Since her last visit. She is walking 2x/week for 45-60 min. Patient states there are some stressors at this time with her 's health. Her sleep is affected. She is practicing mindful eating and is trying to eat healthy options when craving certain foods.  See weight loss plan.    PLAN:  - Patient will continue with walking 2-3x/week for 30-45 min.  - Patient will practice mindful eating especially around the holidays (handout given).  - Patient will practice stress management as discussed.   - Patient will follow dietary advice from KARINA Wynn RD.    It is my professional opinion that patient is in the preparation stage of behavior change.    WOODY Rivera, CPT, CHC

## 2024-12-20 NOTE — PROGRESS NOTES
"NON-SURGICAL WEIGHT LOSS FOLLOW UP    Height:   Ht Readings from Last 1 Encounters:   12/20/24 5' 6" (1.676 m)      Weight:   Wt Readings from Last 3 Encounters:   12/20/24 1022 125.5 kg (276 lb 11.2 oz)   11/26/24 1042 128.8 kg (284 lb)   11/26/24 1001 128.8 kg (284 lb)      Weight loss since previous visit: lost 8#s    Changes in dietary patterns since previous visit:  Smaller portion sizes  Only ate two sweet servings this month- major cutback  Completely cut out sodas- increased water to 5 bottles/day  Not eating out at alll   Snacks on fruits  Tends to skip meals    Plan:   Try tracking calories to see how many grams of protein intaking daily  Aim for 75-80 grams protein/day- don't skip meals  Continue all other healthy habits   "

## 2025-01-03 ENCOUNTER — TELEPHONE (OUTPATIENT)
Dept: FAMILY MEDICINE | Facility: CLINIC | Age: 40
End: 2025-01-03
Payer: COMMERCIAL

## 2025-01-03 DIAGNOSIS — I10 PRIMARY HYPERTENSION: ICD-10-CM

## 2025-01-03 RX ORDER — AMLODIPINE BESYLATE 5 MG/1
5 TABLET ORAL DAILY
Qty: 90 TABLET | Refills: 0 | Status: SHIPPED | OUTPATIENT
Start: 2025-01-03 | End: 2026-01-03

## 2025-01-03 NOTE — TELEPHONE ENCOUNTER
----- Message from Kim sent at 1/3/2025 10:11 AM CST -----  Regarding: med refill  .Who Called: Areli Loco    Refill or New Rx:Refill  RX Name and Strength:amLODIPine (NORVASC) 5 MG tablet  How is the patient currently taking it? (ex. 1XDay):1xday  Is this a 30 day or 90 day RX:90  Local or Mail Order:local  List of preferred pharmacies on file (remove unneeded): [facility] - [address]   Phone: [phone]  Fax: [phone]        Ordering Provider:Annabelle      Preferred Method of Contact: Phone Call  Patient's Preferred Phone Number on File: [phone]   Best Call Back Number, if different:  Additional Information: pt needs authorization from  to refill prescription

## 2025-01-21 ENCOUNTER — PATIENT MESSAGE (OUTPATIENT)
Dept: BARIATRICS | Facility: HOSPITAL | Age: 40
End: 2025-01-21
Payer: COMMERCIAL

## 2025-01-22 ENCOUNTER — OFFICE VISIT (OUTPATIENT)
Dept: SURGERY | Facility: CLINIC | Age: 40
End: 2025-01-22

## 2025-01-22 DIAGNOSIS — E66.01 MORBID OBESITY: ICD-10-CM

## 2025-01-22 DIAGNOSIS — Z71.3 ENCOUNTER FOR WEIGHT LOSS COUNSELING: Primary | ICD-10-CM

## 2025-01-22 DIAGNOSIS — Z71.82 EXERCISE COUNSELING: ICD-10-CM

## 2025-01-22 DIAGNOSIS — E66.9 OBESITY, UNSPECIFIED CLASS, UNSPECIFIED OBESITY TYPE, UNSPECIFIED WHETHER SERIOUS COMORBIDITY PRESENT: ICD-10-CM

## 2025-01-22 DIAGNOSIS — Z71.3 DIETARY COUNSELING: ICD-10-CM

## 2025-01-22 NOTE — PROGRESS NOTES
Areli Loco    No chief complaint on file.    The patient location is: home   The chief complaint leading to consultation is: NSWL f/u     Visit type: audiovisual    Face to Face time with patient: 8min  15 minutes of total time spent on the encounter, which includes face to face time and non-face to face time preparing to see the patient (eg, review of tests), Obtaining and/or reviewing separately obtained history, Documenting clinical information in the electronic or other health record, Independently interpreting results (not separately reported) and communicating results to the patient/family/caregiver, or Care coordination (not separately reported).     Each patient to whom he or she provides medical services by telemedicine is:  (1) informed of the relationship between the physician and patient and the respective role of any other health care provider with respect to management of the patient; and (2) notified that he or she may decline to receive medical services by telemedicine and may withdraw from such care at any time.    Notes:   Physical Exam: LIMITED DUE TO TELEMEDICINE RESTRICTIONS.      HPI: Ms. Areli Loco is a 39 y.o. female in clinic today for non surgical weight loss. Pt here to discuss weight management and possibly weight loss medication.     Pt currently on compounded semaglutide 0.25mg weekly but has not felt much craving or portion control with it. +nausea with the first dose but nothing else. Would like to increase her dosage.      Lowest adult weight: 180#, 2013. Increase in stress and after 4th child weight has increased.      Previous wt loss meds:               Adipex: palpitations, it did work               Metformin: never tried              Contrave: never tried              Qsymia: never tried              GLPs: unsure of what she took      Diet: off of soda, decreased portion size. Less processed foods, more protein  Exercise: attempts but could increase.       Labs (10/25/24): A1c: 5.6%.      HT: 56in   Weights  NSWL Consult (2024): 284#                BMI: 45  NSWL Weight (24): 276#                     BMI: 44  NSWL Weight (25): 272#  BMI:     Pertinent History:  HTN - [x] Yes   [] No  HLD - [] Yes   [x] No  DM  -  [] Yes   [x] No  LORENA - [] Yes   [x] No  GERD - [] Yes   [x] No  Anticoagulation- [] Yes   [x] No    Patient Care Team:  Katia Renae MD as PCP - General (Family Medicine)     Subjective:     12 point review of systems conducted, negative except as stated in the history of present illness. See HPI for details.    Review of patient's allergies indicates:  No Known Allergies  Past Medical History:   Diagnosis Date    Arthritis     Lower back for a while now    Hypertension      Past Surgical History:   Procedure Laterality Date     SECTION  2021    CHOLECYSTECTOMY      LAPAROSCOPIC LIGATION OF FALLOPIAN TUBE Bilateral 2022    Procedure: LIGATION, FALLOPIAN TUBE, LAPAROSCOPIC;  Surgeon: Edwin Lopez DO;  Location: [facility];  Service: OB/GYN;  Laterality: Bilateral;    NOSE SURGERY      TUBAL LIGATION  2022     Family History   Problem Relation Name Age of Onset    Miscarriages / Stillbirths Mother Yolanda     Hyperlipidemia Mother Yolanda     Hypertension Father Jose David     Stroke Father Jose David     Arthritis Maternal Grandmother Hannah     Diabetes Paternal Grandmother Ifeoma      Social History     Tobacco Use    Smoking status: Never    Smokeless tobacco: Never   Substance Use Topics    Alcohol use: Not Currently    Drug use: Never      Current Outpatient Medications   Medication Instructions    alcohol swabs (ALCOHOL PREP PADS) PadM 1 each, Topical (Top), Weekly    amLODIPine (NORVASC) 5 mg, Oral, Daily    insulin syringes, disposable, 1 mL Syrg 8 Syringes, Misc.(Non-Drug; Combo Route), Weekly    metFORMIN (GLUCOPHAGE) 500 MG tablet Take 1 tablet (500 mg total) by mouth daily with breakfast for 7 days, THEN 1  tablet (500 mg total) 2 (two) times daily with meals for 23 days.    phentermine (ADIPEX-P) 37.5 mg tablet Take 1/2 tab for 7 days and then full tab thereafter.    semaglutide (OZEMPIC) 0.25 mg or 0.5 mg (2 mg/3 mL) pen injector Compounded semaglutide 2mg/ml. Injection 12.5 Units Sub-Q once weekly. Disp 1 vial. (8 doses/vial). Bill to patient. Pt to .       Objective:     Vital Signs (Most Recent):  There were no vitals taken for this visit.    Physical Exam: limited due to telemed visit   General:  Alert and oriented.     Integumentary:  Warm, Dry, Pink.    Neurologic:  Alert, Oriented.    Psychiatric:  Cooperative.      Assessment:         ICD-10-CM ICD-9-CM   1. Encounter for weight loss counseling  Z71.3 V65.3   2. Dietary counseling  Z71.3 V65.3   3. Exercise counseling  Z71.82 V65.41   4. Obesity, unspecified class, unspecified obesity type, unspecified whether serious comorbidity present  E66.9 278.00   5. Morbid obesity  E66.01 278.01        Plan:     1. Encounter for weight loss counseling    2. Dietary counseling    3. Exercise counseling    4. Obesity, unspecified class, unspecified obesity type, unspecified whether serious comorbidity present    5. Morbid obesity           Plan:  Diet:   Increase protein this month  Meal consistency  Exercise:   Increase daily physical activity   Discussed types of weight loss medications, risks/benefits of these medications.   Ok to increase to 25units weekly with current vial you have. Will call you next week to see how you do on the increase to see if we will stay with 25units or increase further.   Discussed with patient the risks of   Muscle wasting  Exercise plan discussed with patient. Need weight bearing joint activity to keep lean muscle mass.   Dehydration  Needs to remain very hydrated with 73- 100oz water daily.  Possible headache on injection day, but again stay very hydrated.   Kidney injury due to dehydration  Pancreatitis  Hypoglycemia or low blood  sugar episode  Nausea  Decrease meal portion size to avoid nausea.   Avoid fried/fatty meals  Avoid meals high in sugar or sugary drinks  Thyroid tumor  Pt will self check thyroid as well as I in clinic monthly and will notify office if they feels any new nodules.   Avoid Pregnancy   Discussed avoiding pregnancy with women of child bearing age. Risks of pregnancy on this medication are unknown. Discuss with OB different types of contraceptive methods and what would work best for you and your family.   Pt verbalized understanding  Discussed plan of care with medical bariatrician Dr. nAtoine Cristobal and he was in agreement with proposed treatment plan.   Keep routine scheduled appointments with PCP/specialists.   FU 1 month

## 2025-01-28 ENCOUNTER — TELEPHONE (OUTPATIENT)
Dept: SURGERY | Facility: CLINIC | Age: 40
End: 2025-01-28
Payer: COMMERCIAL

## 2025-01-28 RX ORDER — ISOPROPYL ALCOHOL 70 ML/100ML
1 SWAB TOPICAL WEEKLY
Qty: 40 EACH | Refills: 0 | Status: SHIPPED | OUTPATIENT
Start: 2025-01-28 | End: 2025-10-29

## 2025-01-28 NOTE — TELEPHONE ENCOUNTER
----- Message from MIREILLE Babin sent at 1/22/2025 11:45 AM CST -----  Call pt next week to see if she wants to stay on the 25 units or increase to 1mg weekly

## 2025-01-28 NOTE — TELEPHONE ENCOUNTER
Called pt. Took 25units (0.5mg) on Saturday with mild nausea and no other issues. Reports that she finds she might have a little more craving and portion control but would like to increase her dose to the 1mg weekly.

## 2025-02-05 ENCOUNTER — CLINICAL SUPPORT (OUTPATIENT)
Dept: BARIATRICS | Facility: HOSPITAL | Age: 40
End: 2025-02-05

## 2025-02-05 VITALS — WEIGHT: 269.81 LBS | BODY MASS INDEX: 43.55 KG/M2

## 2025-02-05 DIAGNOSIS — E66.9 OBESITY: Primary | ICD-10-CM

## 2025-02-05 DIAGNOSIS — E66.01 MORBID OBESITY WITH BMI OF 40.0-44.9, ADULT: Primary | ICD-10-CM

## 2025-02-05 PROCEDURE — 94200023 HC BARIATRIC CONSULT - 60 MINS
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
## 2025-02-26 ENCOUNTER — OFFICE VISIT (OUTPATIENT)
Dept: FAMILY MEDICINE | Facility: CLINIC | Age: 40
End: 2025-02-26
Payer: COMMERCIAL

## 2025-02-26 ENCOUNTER — RESULTS FOLLOW-UP (OUTPATIENT)
Dept: FAMILY MEDICINE | Facility: CLINIC | Age: 40
End: 2025-02-26

## 2025-02-26 VITALS
BODY MASS INDEX: 43.69 KG/M2 | RESPIRATION RATE: 18 BRPM | TEMPERATURE: 98 F | OXYGEN SATURATION: 99 % | HEIGHT: 66 IN | SYSTOLIC BLOOD PRESSURE: 124 MMHG | WEIGHT: 271.88 LBS | HEART RATE: 80 BPM | DIASTOLIC BLOOD PRESSURE: 89 MMHG

## 2025-02-26 DIAGNOSIS — N83.8 ENLARGED OVARY: ICD-10-CM

## 2025-02-26 DIAGNOSIS — R31.29 MICROSCOPIC HEMATURIA: ICD-10-CM

## 2025-02-26 DIAGNOSIS — R10.9 ABDOMINAL PAIN, UNSPECIFIED ABDOMINAL LOCATION: Primary | ICD-10-CM

## 2025-02-26 DIAGNOSIS — R10.32 LEFT LOWER QUADRANT ABDOMINAL PAIN: ICD-10-CM

## 2025-02-26 LAB
BILIRUB SERPL-MCNC: ABNORMAL MG/DL
BLOOD URINE, POC: ABNORMAL
CLARITY, POC UA: CLEAR
COLOR, POC UA: YELLOW
GLUCOSE UR QL STRIP: ABNORMAL
KETONES UR QL STRIP: ABNORMAL
LEUKOCYTE ESTERASE URINE, POC: ABNORMAL
NITRITE, POC UA: ABNORMAL
PH, POC UA: 7
PROTEIN, POC: ABNORMAL
SPECIFIC GRAVITY, POC UA: 1.01
UROBILINOGEN, POC UA: 0.2

## 2025-02-26 PROCEDURE — 1159F MED LIST DOCD IN RCRD: CPT | Mod: CPTII,,, | Performed by: FAMILY MEDICINE

## 2025-02-26 PROCEDURE — 81002 URINALYSIS NONAUTO W/O SCOPE: CPT | Mod: ,,, | Performed by: FAMILY MEDICINE

## 2025-02-26 PROCEDURE — 1160F RVW MEDS BY RX/DR IN RCRD: CPT | Mod: CPTII,,, | Performed by: FAMILY MEDICINE

## 2025-02-26 PROCEDURE — 3008F BODY MASS INDEX DOCD: CPT | Mod: CPTII,,, | Performed by: FAMILY MEDICINE

## 2025-02-26 PROCEDURE — 3079F DIAST BP 80-89 MM HG: CPT | Mod: CPTII,,, | Performed by: FAMILY MEDICINE

## 2025-02-26 PROCEDURE — 3074F SYST BP LT 130 MM HG: CPT | Mod: CPTII,,, | Performed by: FAMILY MEDICINE

## 2025-02-26 PROCEDURE — 99214 OFFICE O/P EST MOD 30 MIN: CPT | Mod: ,,, | Performed by: FAMILY MEDICINE

## 2025-02-26 NOTE — PROGRESS NOTES
Areli Lcoo  02/26/2025  [id number]    Katia Renae MD  Patient Care Team:  Katia Renae MD as PCP - General (Family Medicine)      Chief Complaint:  Chief Complaint   Patient presents with    Abdominal Pain     Pt is c/o lower abd pain radiating to the left side for the last few days       History of Present Illness:    39 y.o. female who presents today c/o left mid and llq pain x 6 days, getting worse. No f/c, constipation, n/v/d, flank pain, hemauturia, dysuria. Recently started on ozempic one month ago but has been having normal, soft bowel movements. Pain is not radiating. LMP 3 wks ago. Not worse with eating.     Urine dip + for blood.    Review of Systems  General: denies f/c, weight loss, night sweats, decreased appetite  Eye: denies blurred vision, changes in vision  Respiratory: denies sob, wheezing, cough  Cardiovascular: denies chest pain, palpitations, edema  Integumentary: denies rashes, pruritis    Past Medical History  Past Medical History:   Diagnosis Date    Arthritis     Lower back for a while now    Hypertension        Medications  Medication List with Changes/Refills   Current Medications    ALCOHOL SWABS (ALCOHOL PREP PADS) PADM    Apply 1 each topically once a week. for 40 doses    ALCOHOL SWABS (ALCOHOL PREP PADS) PADM    Apply 1 each topically once a week. for 40 doses    AMLODIPINE (NORVASC) 5 MG TABLET    Take 1 tablet (5 mg total) by mouth once daily.    INSULIN SYRINGES, DISPOSABLE, 1 ML SYRG    8 Syringes by Misc.(Non-Drug; Combo Route) route once a week. for 8 doses    METFORMIN (GLUCOPHAGE) 500 MG TABLET    Take 1 tablet (500 mg total) by mouth daily with breakfast for 7 days, THEN 1 tablet (500 mg total) 2 (two) times daily with meals for 23 days.    PHENTERMINE (ADIPEX-P) 37.5 MG TABLET    Take 1/2 tab for 7 days and then full tab thereafter.    SEMAGLUTIDE (OZEMPIC) 1 MG/DOSE (4 MG/3 ML)    ? Compounded Semaglutide 5mg/ml - 1ml (5mg) Inject 0.2ml  "(1mg) 20 units Sub-Q once weekly Dispense 1 vial (5 doses/vial)       Past Surgical History:   Procedure Laterality Date     SECTION  2021    CHOLECYSTECTOMY      LAPAROSCOPIC LIGATION OF FALLOPIAN TUBE Bilateral 2022    Procedure: LIGATION, FALLOPIAN TUBE, LAPAROSCOPIC;  Surgeon: Edwin Lopez DO;  Location: [facility];  Service: OB/GYN;  Laterality: Bilateral;    NOSE SURGERY      TUBAL LIGATION  2022       SUBJECTIVE:  Health Maintenance  Health Maintenance Topics with due status: Not Due       Topic Last Completion Date    TETANUS VACCINE 2020    Cervical Cancer Screening 2024    Hemoglobin A1c (Diabetic Prevention Screening) 10/25/2024    RSV Vaccine (Age 60+ and Pregnant patients) Not Due     Health Maintenance Due   Topic Date Due    Hepatitis C Screening  Never done    HIV Screening  Never done    COVID-19 Vaccine (3 - 2024-25 season) 2024       Exam:  Vitals:    25 1045   BP: 124/89   BP Location: Right arm   Patient Position: Sitting   Pulse: 80   Resp: 18   Temp: 98.3 °F (36.8 °C)   TempSrc: Oral   SpO2: 99%   Weight: 123.3 kg (271 lb 14.4 oz)   Height: 5' 6" (1.676 m)     Weight: 123.3 kg (271 lb 14.4 oz)   Body mass index is 43.89 kg/m².      Physical Exam  Constitutional: NAD, alert, pleasant  Respiratory: No accessory muscle use, no cough or audible wheezing  Eyes: EOMI, no conjunctivitis   Integumentary: warm, dry, intact  Psych: AA&Ox3, answers questions appropriately  GI: bs+, nondistended. Tender without rebound or guarding to left upper and mid quadrant as well as near the LLQ.       ICD-10-CM ICD-9-CM   1. Abdominal pain, unspecified abdominal location  R10.9 789.00   2. Microscopic hematuria  R31.29 599.72   3. Left lower quadrant abdominal pain  R10.32 789.04   4. Enlarged ovary  N83.8 620.8       1. Abdominal pain, unspecified abdominal location  -     Urine Culture High Risk  -     CT Abdomen Pelvis  Without Contrast; Future; Expected " date: 02/26/2025    2. Microscopic hematuria  -     POCT URINE DIPSTICK WITHOUT MICROSCOPE    3. Left lower quadrant abdominal pain    4. Enlarged ovary  -     US Pelvis Limited Non OB; Future; Expected date: 02/26/2025       Ct abd/pelvis without today for stone search. Could be related to ozempic use  Will culture urine as well for completeness    Ct reviewed which reveals and enlarged left ovary. This could be related to ovulation. I will order pelvic US for further evaluation and start naproxen 500 mg bid, to take with food  Er precautions given  Follow up: No follow-ups on file.      Care Plan/Goals: Reviewed   Goals    None

## 2025-02-26 NOTE — PROGRESS NOTES
Ct shows an enlarged left ovary. This could be due to ovulation. However, I would like to order an US pelvis to further evaluate. No kidney stones or any other abnormalities.     I will send in naproxen bid for now. Take with food.     If pain rapidly worsens, go to ER

## 2025-03-01 LAB — BACTERIA UR CULT: NORMAL

## 2025-03-03 ENCOUNTER — TELEPHONE (OUTPATIENT)
Dept: FAMILY MEDICINE | Facility: CLINIC | Age: 40
End: 2025-03-03
Payer: COMMERCIAL

## 2025-03-03 NOTE — TELEPHONE ENCOUNTER
----- Message from Deb sent at 3/3/2025  2:22 PM CST -----  Who Called: Areli Barriga is requesting assistance/information from provider's office.Symptoms (please be specific):  How long has patient had these symptoms:  List of preferred pharmacies on file (remove unneeded): [unfilled]If different, enter pharmacy into here including location and phone number: Preferred Method of Contact: Phone CallPatient's Preferred Phone Number on File: [phone] Best Call Back Number, if different:Additional Information: Pt is requesting a call from the nurse about her recent test results

## 2025-03-05 ENCOUNTER — HOSPITAL ENCOUNTER (OUTPATIENT)
Dept: RADIOLOGY | Facility: HOSPITAL | Age: 40
Discharge: HOME | End: 2025-03-05
Attending: FAMILY MEDICINE
Payer: COMMERCIAL

## 2025-03-05 DIAGNOSIS — N83.8 ENLARGED OVARY: ICD-10-CM

## 2025-03-05 PROCEDURE — 76856 US EXAM PELVIC COMPLETE: CPT | Mod: TC

## 2025-03-11 ENCOUNTER — LAB VISIT (OUTPATIENT)
Dept: LAB | Facility: HOSPITAL | Age: 40
End: 2025-03-11
Attending: FAMILY MEDICINE
Payer: COMMERCIAL

## 2025-03-11 DIAGNOSIS — Z13.220 ENCOUNTER FOR LIPID SCREENING FOR CARDIOVASCULAR DISEASE: ICD-10-CM

## 2025-03-11 DIAGNOSIS — Z11.4 ENCOUNTER FOR SCREENING FOR HIV: ICD-10-CM

## 2025-03-11 DIAGNOSIS — Z13.1 SCREENING FOR DIABETES MELLITUS: ICD-10-CM

## 2025-03-11 DIAGNOSIS — Z00.00 ENCOUNTER FOR WELLNESS EXAMINATION: ICD-10-CM

## 2025-03-11 DIAGNOSIS — Z11.59 NEED FOR HEPATITIS C SCREENING TEST: ICD-10-CM

## 2025-03-11 DIAGNOSIS — E66.813 CLASS 3 OBESITY: ICD-10-CM

## 2025-03-11 DIAGNOSIS — Z13.6 ENCOUNTER FOR LIPID SCREENING FOR CARDIOVASCULAR DISEASE: ICD-10-CM

## 2025-03-11 LAB
ALBUMIN SERPL-MCNC: 3.4 G/DL (ref 3.5–5)
ALBUMIN/GLOB SERPL: 1.1 RATIO (ref 1.1–2)
ALP SERPL-CCNC: 69 UNIT/L (ref 40–150)
ALT SERPL-CCNC: 7 UNIT/L (ref 0–55)
ANION GAP SERPL CALC-SCNC: 8 MEQ/L
AST SERPL-CCNC: 10 UNIT/L (ref 5–34)
BACTERIA #/AREA URNS AUTO: ABNORMAL /HPF
BASOPHILS # BLD AUTO: 0.04 X10(3)/MCL
BASOPHILS NFR BLD AUTO: 0.4 %
BILIRUB SERPL-MCNC: 0.7 MG/DL
BILIRUB UR QL STRIP.AUTO: NEGATIVE
BUN SERPL-MCNC: 14.4 MG/DL (ref 7–18.7)
CALCIUM SERPL-MCNC: 8.6 MG/DL (ref 8.4–10.2)
CHLORIDE SERPL-SCNC: 109 MMOL/L (ref 98–107)
CHOLEST SERPL-MCNC: 159 MG/DL
CHOLEST/HDLC SERPL: 4 {RATIO} (ref 0–5)
CLARITY UR: ABNORMAL
CO2 SERPL-SCNC: 24 MMOL/L (ref 22–29)
COLOR UR AUTO: YELLOW
CREAT SERPL-MCNC: 0.82 MG/DL (ref 0.55–1.02)
CREAT/UREA NIT SERPL: 18
EOSINOPHIL # BLD AUTO: 0.23 X10(3)/MCL (ref 0–0.9)
EOSINOPHIL NFR BLD AUTO: 2.4 %
ERYTHROCYTE [DISTWIDTH] IN BLOOD BY AUTOMATED COUNT: 14.9 % (ref 11.5–17)
EST. AVERAGE GLUCOSE BLD GHB EST-MCNC: 108.3 MG/DL
GFR SERPLBLD CREATININE-BSD FMLA CKD-EPI: >60 ML/MIN/1.73/M2
GLOBULIN SER-MCNC: 3.1 GM/DL (ref 2.4–3.5)
GLUCOSE SERPL-MCNC: 94 MG/DL (ref 74–100)
GLUCOSE UR QL STRIP: NORMAL
HBA1C MFR BLD: 5.4 %
HCT VFR BLD AUTO: 38.7 % (ref 37–47)
HCV AB SERPL QL IA: NONREACTIVE
HDLC SERPL-MCNC: 38 MG/DL (ref 35–60)
HGB BLD-MCNC: 12.3 G/DL (ref 12–16)
HGB UR QL STRIP: ABNORMAL
HIV 1+2 AB+HIV1 P24 AG SERPL QL IA: NONREACTIVE
IMM GRANULOCYTES # BLD AUTO: 0.03 X10(3)/MCL (ref 0–0.04)
IMM GRANULOCYTES NFR BLD AUTO: 0.3 %
KETONES UR QL STRIP: NEGATIVE
LDLC SERPL CALC-MCNC: 107 MG/DL (ref 50–140)
LEUKOCYTE ESTERASE UR QL STRIP: NEGATIVE
LYMPHOCYTES # BLD AUTO: 2.58 X10(3)/MCL (ref 0.6–4.6)
LYMPHOCYTES NFR BLD AUTO: 26.8 %
MCH RBC QN AUTO: 25.3 PG (ref 27–31)
MCHC RBC AUTO-ENTMCNC: 31.8 G/DL (ref 33–36)
MCV RBC AUTO: 79.6 FL (ref 80–94)
MONOCYTES # BLD AUTO: 0.63 X10(3)/MCL (ref 0.1–1.3)
MONOCYTES NFR BLD AUTO: 6.5 %
MUCOUS THREADS URNS QL MICRO: ABNORMAL /LPF
NEUTROPHILS # BLD AUTO: 6.13 X10(3)/MCL (ref 2.1–9.2)
NEUTROPHILS NFR BLD AUTO: 63.6 %
NITRITE UR QL STRIP: NEGATIVE
NRBC BLD AUTO-RTO: 0 %
PH UR STRIP: 5.5 [PH]
PLATELET # BLD AUTO: 329 X10(3)/MCL (ref 130–400)
PMV BLD AUTO: 10.5 FL (ref 7.4–10.4)
POTASSIUM SERPL-SCNC: 4.4 MMOL/L (ref 3.5–5.1)
PROT SERPL-MCNC: 6.5 GM/DL (ref 6.4–8.3)
PROT UR QL STRIP: ABNORMAL
RBC # BLD AUTO: 4.86 X10(6)/MCL (ref 4.2–5.4)
RBC #/AREA URNS AUTO: >100 /HPF
SODIUM SERPL-SCNC: 141 MMOL/L (ref 136–145)
SP GR UR STRIP.AUTO: 1.03 (ref 1–1.03)
SQUAMOUS #/AREA URNS LPF: ABNORMAL /HPF
TRIGL SERPL-MCNC: 72 MG/DL (ref 37–140)
TSH SERPL-ACNC: 1.72 UIU/ML (ref 0.35–4.94)
UROBILINOGEN UR STRIP-ACNC: NORMAL
VLDLC SERPL CALC-MCNC: 14 MG/DL
WBC # BLD AUTO: 9.64 X10(3)/MCL (ref 4.5–11.5)
WBC #/AREA URNS AUTO: ABNORMAL /HPF

## 2025-03-11 PROCEDURE — 85025 COMPLETE CBC W/AUTO DIFF WBC: CPT

## 2025-03-11 PROCEDURE — 80053 COMPREHEN METABOLIC PANEL: CPT

## 2025-03-11 PROCEDURE — 86803 HEPATITIS C AB TEST: CPT

## 2025-03-11 PROCEDURE — 83036 HEMOGLOBIN GLYCOSYLATED A1C: CPT

## 2025-03-11 PROCEDURE — 80061 LIPID PANEL: CPT

## 2025-03-11 PROCEDURE — 81001 URINALYSIS AUTO W/SCOPE: CPT

## 2025-03-11 PROCEDURE — 36415 COLL VENOUS BLD VENIPUNCTURE: CPT

## 2025-03-11 PROCEDURE — 84443 ASSAY THYROID STIM HORMONE: CPT

## 2025-03-11 PROCEDURE — 87389 HIV-1 AG W/HIV-1&-2 AB AG IA: CPT

## 2025-03-12 ENCOUNTER — RESULTS FOLLOW-UP (OUTPATIENT)
Dept: FAMILY MEDICINE | Facility: CLINIC | Age: 40
End: 2025-03-12

## 2025-03-13 ENCOUNTER — PATIENT MESSAGE (OUTPATIENT)
Dept: BARIATRICS | Facility: HOSPITAL | Age: 40
End: 2025-03-13
Payer: COMMERCIAL

## 2025-03-28 ENCOUNTER — PATIENT OUTREACH (OUTPATIENT)
Facility: CLINIC | Age: 40
End: 2025-03-28
Payer: COMMERCIAL

## 2025-03-28 ENCOUNTER — OFFICE VISIT (OUTPATIENT)
Dept: FAMILY MEDICINE | Facility: CLINIC | Age: 40
End: 2025-03-28
Payer: COMMERCIAL

## 2025-03-28 ENCOUNTER — TELEPHONE (OUTPATIENT)
Dept: FAMILY MEDICINE | Facility: CLINIC | Age: 40
End: 2025-03-28

## 2025-03-28 VITALS
DIASTOLIC BLOOD PRESSURE: 80 MMHG | BODY MASS INDEX: 43.07 KG/M2 | WEIGHT: 268 LBS | HEART RATE: 78 BPM | HEIGHT: 66 IN | RESPIRATION RATE: 18 BRPM | TEMPERATURE: 98 F | SYSTOLIC BLOOD PRESSURE: 128 MMHG | OXYGEN SATURATION: 98 %

## 2025-03-28 DIAGNOSIS — E66.01 MORBID OBESITY WITH BMI OF 40.0-44.9, ADULT: ICD-10-CM

## 2025-03-28 DIAGNOSIS — I10 PRIMARY HYPERTENSION: ICD-10-CM

## 2025-03-28 DIAGNOSIS — Z00.00 ENCOUNTER FOR WELLNESS EXAMINATION: Primary | ICD-10-CM

## 2025-03-28 RX ORDER — LOSARTAN POTASSIUM 25 MG/1
25 TABLET ORAL DAILY
Qty: 90 TABLET | Refills: 1 | Status: SHIPPED | OUTPATIENT
Start: 2025-03-28 | End: 2026-03-28

## 2025-03-28 NOTE — PROGRESS NOTES
Patient ID: [id number]     Chief Complaint: Annual Exam (Wellness and labs. Swelling in Bilateral LE x2 weeks and pt feel that it's due to the Norvasc. Pt stated her legs feels heavy.)        HPI:     Areli Loco is a 39 y.o. female here today for an annual wellness visit. Reviewed and discussed lab results. Overall she feels well. No other complaints today. Denies depression, si/hi, melena, hematochezia.     As a separate em visit, patient states the amlodpine makes her legs feel heavy. She denies edema. She would like to try a different medication.       -------------------------------------    Arthritis    Lower back for a while now    Hypertension        Past Surgical History:   Procedure Laterality Date     SECTION  2021    CHOLECYSTECTOMY  2010    LAPAROSCOPIC LIGATION OF FALLOPIAN TUBE Bilateral 2022    Procedure: LIGATION, FALLOPIAN TUBE, LAPAROSCOPIC;  Surgeon: Edwin Lopez DO;  Location: [facility];  Service: OB/GYN;  Laterality: Bilateral;    NOSE SURGERY      TUBAL LIGATION  2022       Review of patient's allergies indicates:  No Known Allergies    Outpatient Medications Marked as Taking for the 3/28/25 encounter (Office Visit) with Katia Renae MD   Medication Sig Dispense Refill    alcohol swabs (ALCOHOL PREP PADS) PadM Apply 1 each topically once a week. for 40 doses 40 each 0    semaglutide (OZEMPIC) 1 mg/dose (4 mg/3 mL) ? Compounded Semaglutide 5mg/ml - 1ml (5mg) Inject 0.2ml (1mg) 20 units Sub-Q once weekly Dispense 1 vial (5 doses/vial) 1 mL 0    [DISCONTINUED] amLODIPine (NORVASC) 5 MG tablet Take 1 tablet (5 mg total) by mouth once daily. 90 tablet 0       Social History[1]     Family History   Problem Relation Name Age of Onset    Miscarriages / Stillbirths Mother Yolanda     Hyperlipidemia Mother Yolanda     Hypertension Father Jose David     Stroke Father Jose David     Arthritis Maternal Grandmother Hannah     Diabetes Paternal Grandmother Ivy      "    Patient Care Team:  Katia Renae MD as PCP - General (Family Medicine)       Subjective:     ROS    Constitutional: Denies Change in appetite. Denies Chills. Denies Fever. Denies Night sweats.  Eye: Denies changes in vision  ENT: Denies Decreased hearing. Denies Sore throat. Denies Swollen glands.  Respiratory: Denies Cough. Denies Shortness of breath. Denies Shortness of breath with exertion. Denies Wheezing.  Cardiovascular: DeniesChest pain at rest. Denies Chest pain with exertion. Denies Irregular heartbeat. Denies Palpitations. Denies Edema.  Gastrointestinal: Denies Abdominal pain. DeniesDiarrhea. Denies Nausea. Denies Vomiting. Denies Hematemesis or Hematochezia.  Genitourinary: Denies Dysuria. Denies Urinary frequency. Denies Urinary urgency. Denies Blood in urine.  Endocrine: Denies Cold intolerance. Denies Excessive thirst. Denies Heat intolerance. Denies Weight loss. Denies Weight gain.  Musculoskeletal: Denies Painful joints. Denies Weakness.  Integumentary: Denies Rash. Denies Itching. Denies Dry skin.  Neurologic: Denies Dizziness. Denies Fainting. Denies Headache.  Psychiatric: Denies Depression. Denies Anxiety. Denies Suicidal/Homicidal ideations.    All Other ROS: Negative except as stated in HPI.       Objective:     /80 (BP Location: Right arm, Patient Position: Sitting)   Pulse 78   Temp 98.1 °F (36.7 °C) (Oral)   Resp 18   Ht 5' 6" (1.676 m)   Wt 121.6 kg (268 lb)   LMP 03/10/2025   SpO2 98%   BMI 43.26 kg/m²     Physical Exam    General: Alert and oriented, No acute distress.  Head: Normocephalic, Atraumatic.  Eye: Pupils are equal, round and reactive to light, Extraocular movements are intact, Sclera non-icteric.  Ears/Nose/Throat: TM+ light reflexes bilaterally. Normal, Mucosa moist,Clear. Teeth intact, no lesions of tongue, palate, mucosa.  Respiratory: Clear to auscultation bilaterally; No wheezes, rales or rhonchi, Non-labored respirations, Symmetrical chest " wall expansion.  Cardiovascular: Regular rate and rhythm, S1/S2 normal, No murmurs, rubs or gallops.  Gastrointestinal: Soft, Non-tender, Non-distended, Normal bowel sounds, No palpable organomegaly.  Integumentary: Warm, Dry, Intact, No suspicious lesions or rashes.  Extremities: No clubbing, cyanosis or edema  Psychiatric: Normal interaction, Coherent speech, Euthymic mood, Appropriate affect       Assessment:     Problem List Items Addressed This Visit       Morbid obesity with BMI of 40.0-44.9, adult    Overview   Working out twice a week    Has lost 20 lbs on ozempic    - exercise for 30-45 min a day, 5x a week  - eat a total of 1500 calories daily with less than 70 total carbohydrates daily  - use my fitness pal to log foods and track calories  - eat lean meats like chicken, turkey, fish, lean ground beef. Avoid fried, fatty or processed foods.  - do not eat pasta, bread, rice, potatoes often           Primary hypertension    Overview   Bp well controlled with amlodipine 5 mg daily. C/o bl leg heaviness    Stop amlodipine  Start losartan 25 mg daily  Rtc 6 mts  Recommend a low salt diet, weight loss and exercise  Avoid drinking too much caffeine  Take blood pressure medications 2-3 hours BEFORE every appointment so we can get an accurate reading in the office  Check your blood pressure twice a day and write it down. Bring blood pressure log and blood pressure cuff to next visit  Call me with pressure more than 140/90 or less than 100/60            Other Visit Diagnoses         Encounter for wellness examination    -  Primary            Plan:         Health Maintenance Topics with due status: Not Due       Topic Last Completion Date    TETANUS VACCINE 04/19/2020    Cervical Cancer Screening 03/18/2024    Hemoglobin A1c (Diabetic Prevention Screening) 03/11/2025    RSV Vaccine (Age 60+ and Pregnant patients) Not Due        Eye Exam - Recommend annually.    Dental Exam - Recommend biannual exams.     Vaccinations  -   Immunization History   Administered Date(s) Administered    COVID-19, MRNA, LN-S, PF (Pfizer) (Gray Cap) 02/09/2022    COVID-19, MRNA, LN-S, PF (Pfizer) (Purple Cap) 01/12/2022    DTaP 1985, 1985, 1985, 01/20/1987, 12/28/1990    HIB 07/20/1997    Hepatitis B 08/14/2000    Hepatitis B, Adolescent/High Risk Infant 02/08/2000, 03/10/2000    Influenza 01/08/2016    Influenza - Quadrivalent - MDCK - PF 09/22/2022    MMR 09/30/1986, 08/16/1991    OPV 1985, 1985, 1985, 01/20/1987, 12/28/1990    Td (ADULT) 02/08/2000    Tdap 12/07/2010, 04/19/2020      Patient was counseled on risks/benefits of receiving immunization. All questions were answered    Perform monthly self breast exams and call me immediately if you find a lump/bump or have any skin/nipple changes  Exercise for a total of 150 min per week and eat a healthy diet  Stay up to date with all cancer screening discussed in visit  Immunizations due were discussed during visit  All health maintenance was reviewed with patient. Patient verbalized understanding. All questions were answered.     Medication List with Changes/Refills   New Medications    LOSARTAN (COZAAR) 25 MG TABLET    Take 1 tablet (25 mg total) by mouth once daily.       Start Date: 3/28/2025 End Date: 3/28/2026   Current Medications    ALCOHOL SWABS (ALCOHOL PREP PADS) PADM    Apply 1 each topically once a week. for 40 doses       Start Date: 1/28/2025 End Date: 10/29/2025    SEMAGLUTIDE (OZEMPIC) 1 MG/DOSE (4 MG/3 ML)    ? Compounded Semaglutide 5mg/ml - 1ml (5mg) Inject 0.2ml (1mg) 20 units Sub-Q once weekly Dispense 1 vial (5 doses/vial)       Start Date: 1/28/2025 End Date: --   Discontinued Medications    ALCOHOL SWABS (ALCOHOL PREP PADS) PADM    Apply 1 each topically once a week. for 40 doses       Start Date: 12/20/2024End Date: 3/28/2025    AMLODIPINE (NORVASC) 5 MG TABLET    Take 1 tablet (5 mg total) by mouth once daily.       Start Date: 1/3/2025  End  Date: 3/28/2025    LABETALOL (NORMODYNE) 100 MG TABLET    Take 100 mg by mouth every 12 (twelve) hours.       Start Date: 2/23/2022 End Date: 3/28/2025    METFORMIN (GLUCOPHAGE) 500 MG TABLET    Take 1 tablet (500 mg total) by mouth daily with breakfast for 7 days, THEN 1 tablet (500 mg total) 2 (two) times daily with meals for 23 days.       Start Date: 11/28/2024End Date: 3/28/2025    PHENTERMINE (ADIPEX-P) 37.5 MG TABLET    Take 1/2 tab for 7 days and then full tab thereafter.       Start Date: 11/28/2024End Date: 3/28/2025          The patient's Health Maintenance was reviewed and the following appears to be due at this time:   There are no preventive care reminders to display for this patient.        Follow up for 6 mts htn and one year wellness . In addition to their scheduled follow up, the patient has also been instructed to follow up on as needed basis.                [1]   Social History  Socioeconomic History    Marital status:    Tobacco Use    Smoking status: Never    Smokeless tobacco: Never   Substance and Sexual Activity    Alcohol use: Not Currently    Drug use: Never    Sexual activity: Yes     Partners: Male     Birth control/protection: None   Social History Narrative    ** Merged History Encounter **          Social Drivers of Health     Financial Resource Strain: Low Risk  (3/28/2025)    Overall Financial Resource Strain (CARDIA)     Difficulty of Paying Living Expenses: Not very hard   Food Insecurity: No Food Insecurity (3/28/2025)    Hunger Vital Sign     Worried About Running Out of Food in the Last Year: Never true     Ran Out of Food in the Last Year: Never true   Transportation Needs: No Transportation Needs (3/28/2025)    PRAPARE - Transportation     Lack of Transportation (Medical): No     Lack of Transportation (Non-Medical): No   Physical Activity: Insufficiently Active (3/28/2025)    Exercise Vital Sign     Days of Exercise per Week: 2 days     Minutes of Exercise per  Session: 30 min   Stress: Stress Concern Present (3/28/2025)    [employer] - Occupational Stress Questionnaire     Feeling of Stress : To some extent   Housing Stability: Low Risk  (3/28/2025)    Housing Stability Vital Sign     Unable to Pay for Housing in the Last Year: No     Number of Times Moved in the Last Year: 0     Homeless in the Last Year: No

## 2025-03-28 NOTE — LETTER
AUTHORIZATION FOR RELEASE OF CONFIDENTIAL INFORMATION      2025      Dear Dr Christine Linares,    We are seeing Areli Loco, date of birth 1985, in the clinic at [facility].  Katia Renae MD is the patient's PCP. Areli Loco has an outstanding lab/procedure at the time we reviewed his chart.  In order to help keep her health information updated, Areli has authorized us to request the following medical record(s):              Pap Smear         Please fax any records to Katia Renae MD's at  [phone]    If you have any questions, please contact  Dionte SANCHEZ,CCC @ [phone]             Patient Name: Areli Loco  :1985  Patient Phone #:[phone]

## 2025-03-28 NOTE — PROGRESS NOTES
Health Maintenance Topic(s) Outreach Outcomes & Actions Taken:    Cervical Cancer Screening - Outreach Outcomes & Actions Taken  : External Records Requested & Care Team Updated if Applicable         Additional Notes:  Request Pap Smear Report: Yarelis Linares

## 2025-04-01 NOTE — PROGRESS NOTES
.Areli Loco    Chief Complaint   Patient presents with    Obesity     Nonsurgical weight loss follow-up       HPI: Ms. Areli Loco is a 39 y.o. female in clinic today for non surgical weight loss. Pt here to discuss weight management and possibly weight loss medication.      Pt has not been seen for the last two months. Has had very stressful time with daughter and her current medical issues. Would like to get back on track with diet and exercise as well as compounded medication.     Lowest adult weight: 180#, 2013. Increase in stress and after 4th child weight has increased.      Previous wt loss meds:               Adipex: palpitations, it did work               Topamax: did not like side effects.               Metformin: never tried              Contrave: never tried              Qsymia: never tried              GLPs: unsure of what she took      Diet: off of soda, decreased portion size. Less processed foods, more protein  Exercise: attempts but could increase.      Labs (10/25/24): A1c: 5.6%.      HT: 56in   Weights  NSWL Consult (2024): 284#                BMI: 45  NSWL Weight (24): 276#                     BMI: 44  NSWL Weight (25): 272#                       BMI:  NSWL Weight (25): 266#   BMI: 42     Pertinent History:  HTN - [x] Yes   [] No  HLD - [] Yes   [x] No  DM  -  [] Yes   [x] No  LORENA - [] Yes   [x] No  GERD - [] Yes   [x] No  Anticoagulation- [] Yes   [x] No    Patient Care Team:  Katia Renae MD as PCP - General (Family Medicine)     Subjective:     12 point review of systems conducted, negative except as stated in the history of present illness. See HPI for details.    Review of patient's allergies indicates:  No Known Allergies  Past Medical History:   Diagnosis Date    Arthritis     Lower back for a while now    Hypertension      Past Surgical History:   Procedure Laterality Date     SECTION  2021    CHOLECYSTECTOMY  2010     "LAPAROSCOPIC LIGATION OF FALLOPIAN TUBE Bilateral 05/16/2022    Procedure: LIGATION, FALLOPIAN TUBE, LAPAROSCOPIC;  Surgeon: Edwin Lopez DO;  Location: [facility];  Service: OB/GYN;  Laterality: Bilateral;    NOSE SURGERY      TUBAL LIGATION  05/2022     Family History   Problem Relation Name Age of Onset    Miscarriages / Stillbirths Mother Yolanda     Hyperlipidemia Mother Yolanda     Hypertension Father Jose David     Stroke Father Jose David     Arthritis Maternal Grandmother Hannah     Diabetes Paternal Grandmother Ifeoma      Social History[1]   Current Outpatient Medications   Medication Instructions    alcohol swabs (ALCOHOL PREP PADS) PadM 1 each, Topical (Top), Weekly    insulin syringes, disposable, 1 mL Syrg 20 Syringes, Misc.(Non-Drug; Combo Route), Weekly    losartan (COZAAR) 25 mg, Oral, Daily    semaglutide (OZEMPIC) 1 mg/dose (4 mg/3 mL) ? Compounded Semaglutide 5mg/ml - 1ml (5mg) Inject 0.2ml (1mg) 20 units Sub-Q once weekly Dispense 1 vial (5 doses/vial)    semaglutide, weight loss, 1 mg/0.5 mL PnIj Compounded Semaglutide 5mg/ml - 3ml (15mg). Inject Sub-Q weekly as directed by provider. Dispense 2 vials       Objective:     Vital Signs (Most Recent):  Visit Vitals  /84 (Patient Position: Sitting)   Pulse 84   Ht 5' 6" (1.676 m)   Wt 120.7 kg (266 lb 3.2 oz)   LMP 03/10/2025   BMI 42.97 kg/m²     Physical Exam:  General:  Alert and oriented.    Respiratory:  Lungs are clear to auscultation, Respirations are non-labored, Breath sounds are equal.    Cardiovascular:  Normal rate, Regular rhythm, No murmur.    Gastrointestinal:  Soft, Non-tender, Non-distended, Normal bowel sounds        Musculoskeletal:  Normal range of motion, Normal strength.    Integumentary:  Warm, Dry, Pink.    Neurologic:  Alert, Oriented.    Psychiatric:  Cooperative.      Assessment:         ICD-10-CM ICD-9-CM   1. Obesity, unspecified class, unspecified obesity type, unspecified whether serious comorbidity present  E66.9 278.00 "   2. Encounter for weight loss counseling  Z71.3 V65.3   3. Dietary counseling  Z71.3 V65.3   4. Exercise counseling  Z71.82 V65.41        Plan:     1. Obesity, unspecified class, unspecified obesity type, unspecified whether serious comorbidity present  -     semaglutide, weight loss, 1 mg/0.5 mL PnIj; Compounded Semaglutide 5mg/ml - 3ml (15mg). Inject Sub-Q weekly as directed by provider. Dispense 2 vials  Dispense: 3 mL; Refill: 0    2. Encounter for weight loss counseling    3. Dietary counseling    4. Exercise counseling    Other orders  -     insulin syringes, disposable, 1 mL Syrg; 20 Syringes by Misc.(Non-Drug; Combo Route) route once a week. for 20 doses  Dispense: 1 each; Refill: 0           Plan:  Diet:   Increase protein this month  Meal consistency  Exercise:   Increase daily physical activity   Discussed types of weight loss medications, risks/benefits of these medications.    semaglutide, weight loss, 1 mg/0.5 mL PnIj; Compounded Semaglutide 5mg/ml - 3ml (15mg). Inject Sub-Q weekly as directed by provider. Dispense 2 vials  Dispense: 3 mL; Refill: 0  Inject 8 units (.35mg) weekly for 2 weeks then ok to increase to 10 units (5mg) week until next visit if not having any side effects and need better craving, portion, and food noise control.   Discussed with patient the risks of   Muscle wasting  Exercise plan discussed with patient. Need weight bearing joint activity to keep lean muscle mass.   Dehydration  Needs to remain very hydrated with 73- 100oz water daily.  Possible headache on injection day, but again stay very hydrated.   Kidney injury due to dehydration  Pancreatitis  Hypoglycemia or low blood sugar episode  Nausea  Decrease meal portion size to avoid nausea.   Avoid fried/fatty meals  Avoid meals high in sugar or sugary drinks  Thyroid tumor  Pt will self check thyroid as well as I in clinic monthly and will notify office if they feels any new nodules.   Avoid Pregnancy   Discussed avoiding  pregnancy with women of child bearing age. Risks of pregnancy on this medication are unknown. Discuss with OB different types of contraceptive methods and what would work best for you and your family.   Pt verbalized understanding  Discussed plan of care with medical bariatrician Dr. Antoine Cristobal and he was in agreement with proposed treatment plan.   Keep routine scheduled appointments with PCP/specialists.   FU 1 month          [1]   Social History  Tobacco Use    Smoking status: Never    Smokeless tobacco: Never   Substance Use Topics    Alcohol use: Not Currently    Drug use: Never

## 2025-04-02 ENCOUNTER — OFFICE VISIT (OUTPATIENT)
Dept: SURGERY | Facility: CLINIC | Age: 40
End: 2025-04-02

## 2025-04-02 ENCOUNTER — PATIENT MESSAGE (OUTPATIENT)
Dept: SURGERY | Facility: CLINIC | Age: 40
End: 2025-04-02

## 2025-04-02 ENCOUNTER — CLINICAL SUPPORT (OUTPATIENT)
Dept: BARIATRICS | Facility: HOSPITAL | Age: 40
End: 2025-04-02

## 2025-04-02 VITALS
HEART RATE: 84 BPM | BODY MASS INDEX: 42.78 KG/M2 | WEIGHT: 266.19 LBS | HEIGHT: 66 IN | SYSTOLIC BLOOD PRESSURE: 122 MMHG | DIASTOLIC BLOOD PRESSURE: 84 MMHG

## 2025-04-02 VITALS — WEIGHT: 266.19 LBS | BODY MASS INDEX: 42.97 KG/M2

## 2025-04-02 DIAGNOSIS — E66.9 OBESITY, UNSPECIFIED CLASS, UNSPECIFIED OBESITY TYPE, UNSPECIFIED WHETHER SERIOUS COMORBIDITY PRESENT: Primary | ICD-10-CM

## 2025-04-02 DIAGNOSIS — Z71.3 DIETARY COUNSELING: ICD-10-CM

## 2025-04-02 DIAGNOSIS — E66.01 MORBID OBESITY WITH BMI OF 40.0-44.9, ADULT: Primary | ICD-10-CM

## 2025-04-02 DIAGNOSIS — Z71.3 ENCOUNTER FOR WEIGHT LOSS COUNSELING: ICD-10-CM

## 2025-04-02 DIAGNOSIS — Z71.82 EXERCISE COUNSELING: ICD-10-CM

## 2025-04-02 PROCEDURE — 94200023 HC BARIATRIC CONSULT - 60 MINS

## 2025-04-02 NOTE — PROGRESS NOTES
"NON-SURGICAL WEIGHT LOSS FOLLOW UP    Height:   Ht Readings from Last 1 Encounters:   03/28/25 5' 6" (1.676 m)      Weight:   Wt Readings from Last 3 Encounters:   04/02/25 1354 120.7 kg (266 lb 3.2 oz)   03/28/25 0944 121.6 kg (268 lb)   02/26/25 1045 123.3 kg (271 lb 14.4 oz)      Weight loss since previous visit: lost 3#    Changes in dietary patterns since previous visit:  Ozempic - started one month ago, suppressed appetite and helped prevent sweets cravings  After stopped taking Ozempic- sweets cravings returned  More stressful and busy schedule past month due to daughter's frequent visits to doctor  Lack of preparation and planning ahead due to schedule  Did try to choose better options more at work like salads or meal replacement shake  More [employer]/fast food for lunch past month - 3-4 days weekly  [place] at night or  would cook rice and gravy/rice and roast  Sodas at work- 1 diet soda daily, 50 oz water daily  "

## 2025-04-07 ENCOUNTER — PATIENT MESSAGE (OUTPATIENT)
Dept: SURGERY | Facility: CLINIC | Age: 40
End: 2025-04-07
Payer: COMMERCIAL

## 2025-04-07 DIAGNOSIS — E66.9 OBESITY, UNSPECIFIED CLASS, UNSPECIFIED OBESITY TYPE, UNSPECIFIED WHETHER SERIOUS COMORBIDITY PRESENT: Primary | ICD-10-CM

## 2025-04-07 RX ORDER — PHENTERMINE AND TOPIRAMATE 11.25; 69 MG/1; MG/1
1 CAPSULE, EXTENDED RELEASE ORAL DAILY
Qty: 30 CAPSULE | Refills: 0 | Status: SHIPPED | OUTPATIENT
Start: 2025-04-07 | End: 2025-05-07

## 2025-04-08 ENCOUNTER — CLINICAL SUPPORT (OUTPATIENT)
Dept: BARIATRICS | Facility: HOSPITAL | Age: 40
End: 2025-04-08

## 2025-04-08 VITALS — BODY MASS INDEX: 43.09 KG/M2 | WEIGHT: 267 LBS

## 2025-04-08 DIAGNOSIS — E66.9 OBESITY: Primary | ICD-10-CM

## 2025-04-08 NOTE — PROGRESS NOTES
A NONsurgical medically supervised weight loss visit was conducted today over the phone.  Patient's verbal weight is 267 lbs. She has lost 2 lbs. since last visit in Feb. She reports that she has been off of semaglutide one month. She is not walking consistently. Patient was re-educated of behavior changes.      PLAN:  - Patient will walk 3x/week for 30 min. Or more.  - Patient will practice mindful eating behaviors discussed.  - Patient will follow dietary advice from bariatric dietician.    It is my professional opinion that patient is in the contemplation stage of behavior change.    Abbey Gil, WOODY, CPT, CHC

## 2025-06-17 ENCOUNTER — HOSPITAL ENCOUNTER (EMERGENCY)
Facility: HOSPITAL | Age: 40
Discharge: HOME | End: 2025-06-17
Attending: SPECIALIST
Payer: COMMERCIAL

## 2025-06-17 VITALS
OXYGEN SATURATION: 100 % | HEART RATE: 75 BPM | HEIGHT: 66 IN | BODY MASS INDEX: 42.27 KG/M2 | DIASTOLIC BLOOD PRESSURE: 94 MMHG | SYSTOLIC BLOOD PRESSURE: 137 MMHG | TEMPERATURE: 99 F | WEIGHT: 263 LBS | RESPIRATION RATE: 19 BRPM

## 2025-06-17 DIAGNOSIS — R07.89 ATYPICAL CHEST PAIN: Primary | ICD-10-CM

## 2025-06-17 DIAGNOSIS — R07.9 CHEST PAIN: ICD-10-CM

## 2025-06-17 LAB
ALBUMIN SERPL-MCNC: 3.1 G/DL (ref 3.5–5)
ALBUMIN/GLOB SERPL: 0.8 RATIO (ref 1.1–2)
ALP SERPL-CCNC: 63 UNIT/L (ref 40–150)
ALT SERPL-CCNC: 11 UNIT/L (ref 0–55)
ANION GAP SERPL CALC-SCNC: 6 MEQ/L
AST SERPL-CCNC: 9 UNIT/L (ref 11–45)
BASOPHILS # BLD AUTO: 0.03 X10(3)/MCL
BASOPHILS NFR BLD AUTO: 0.3 %
BILIRUB SERPL-MCNC: 0.5 MG/DL
BUN SERPL-MCNC: 17.9 MG/DL (ref 7–18.7)
CALCIUM SERPL-MCNC: 8.7 MG/DL (ref 8.4–10.2)
CHLORIDE SERPL-SCNC: 105 MMOL/L (ref 98–107)
CO2 SERPL-SCNC: 28 MMOL/L (ref 22–29)
CREAT SERPL-MCNC: 1.01 MG/DL (ref 0.55–1.02)
CREAT/UREA NIT SERPL: 18
EOSINOPHIL # BLD AUTO: 0.24 X10(3)/MCL (ref 0–0.9)
EOSINOPHIL NFR BLD AUTO: 2.2 %
ERYTHROCYTE [DISTWIDTH] IN BLOOD BY AUTOMATED COUNT: 14.2 % (ref 11.5–17)
GFR SERPLBLD CREATININE-BSD FMLA CKD-EPI: >60 ML/MIN/1.73/M2
GLOBULIN SER-MCNC: 3.8 GM/DL (ref 2.4–3.5)
GLUCOSE SERPL-MCNC: 112 MG/DL (ref 74–100)
HCT VFR BLD AUTO: 35.8 % (ref 37–47)
HGB BLD-MCNC: 11.1 G/DL (ref 12–16)
IMM GRANULOCYTES # BLD AUTO: 0.02 X10(3)/MCL (ref 0–0.04)
IMM GRANULOCYTES NFR BLD AUTO: 0.2 %
LYMPHOCYTES # BLD AUTO: 2.95 X10(3)/MCL (ref 0.6–4.6)
LYMPHOCYTES NFR BLD AUTO: 27.3 %
MCH RBC QN AUTO: 25.4 PG (ref 27–31)
MCHC RBC AUTO-ENTMCNC: 31 G/DL (ref 33–36)
MCV RBC AUTO: 81.9 FL (ref 80–94)
MONOCYTES # BLD AUTO: 0.71 X10(3)/MCL (ref 0.1–1.3)
MONOCYTES NFR BLD AUTO: 6.6 %
NEUTROPHILS # BLD AUTO: 6.87 X10(3)/MCL (ref 2.1–9.2)
NEUTROPHILS NFR BLD AUTO: 63.4 %
OHS QRS DURATION: 74 MS
OHS QTC CALCULATION: 445 MS
PLATELET # BLD AUTO: 337 X10(3)/MCL (ref 130–400)
PMV BLD AUTO: 10.3 FL (ref 7.4–10.4)
POTASSIUM SERPL-SCNC: 3.7 MMOL/L (ref 3.5–5.1)
PROT SERPL-MCNC: 6.9 GM/DL (ref 6.4–8.3)
RBC # BLD AUTO: 4.37 X10(6)/MCL (ref 4.2–5.4)
SODIUM SERPL-SCNC: 139 MMOL/L (ref 136–145)
TROPONIN I SERPL-MCNC: <0.01 NG/ML (ref 0–0.04)
WBC # BLD AUTO: 10.82 X10(3)/MCL (ref 4.5–11.5)

## 2025-06-17 PROCEDURE — 84484 ASSAY OF TROPONIN QUANT: CPT | Performed by: SPECIALIST

## 2025-06-17 PROCEDURE — 93005 ELECTROCARDIOGRAM TRACING: CPT

## 2025-06-17 PROCEDURE — 85025 COMPLETE CBC W/AUTO DIFF WBC: CPT | Performed by: SPECIALIST

## 2025-06-17 PROCEDURE — 80053 COMPREHEN METABOLIC PANEL: CPT | Performed by: SPECIALIST

## 2025-06-17 PROCEDURE — 93010 ELECTROCARDIOGRAM REPORT: CPT | Mod: ,,, | Performed by: INTERNAL MEDICINE

## 2025-06-17 PROCEDURE — 25000003 PHARM REV CODE 250: Performed by: SPECIALIST

## 2025-06-17 PROCEDURE — 99284 EMERGENCY DEPT VISIT MOD MDM: CPT | Mod: 25

## 2025-06-17 RX ORDER — IBUPROFEN 600 MG/1
600 TABLET, FILM COATED ORAL EVERY 6 HOURS PRN
Qty: 20 TABLET | Refills: 0 | Status: SHIPPED | OUTPATIENT
Start: 2025-06-17

## 2025-06-17 RX ORDER — IBUPROFEN 600 MG/1
600 TABLET, FILM COATED ORAL
Status: COMPLETED | OUTPATIENT
Start: 2025-06-17 | End: 2025-06-17

## 2025-06-17 RX ADMIN — IBUPROFEN 600 MG: 600 TABLET, FILM COATED ORAL at 01:06

## 2025-06-17 NOTE — ED PROVIDER NOTES
Encounter Date: 2025       History     Chief Complaint   Patient presents with    Chest Pain     States pain started around 10:30. Just lying in bed. States pain radiating left area and back. Nothing taken for discomfort     39-year-old female with left-sided chest pain and some left shoulder pain that began while she was lying in bed; this was around 2 hours ago; no diaphoresis, no shortness of breath, no nausea, no pleuritic pain, no recent injury    The history is provided by the patient.     Review of patient's allergies indicates:  No Known Allergies  Past Medical History:   Diagnosis Date    Arthritis     Lower back for a while now    Hypertension      Past Surgical History:   Procedure Laterality Date     SECTION  2021    CHOLECYSTECTOMY  2010    LAPAROSCOPIC LIGATION OF FALLOPIAN TUBE Bilateral 2022    Procedure: LIGATION, FALLOPIAN TUBE, LAPAROSCOPIC;  Surgeon: Edwin Lopez DO;  Location: [facility];  Service: OB/GYN;  Laterality: Bilateral;    NOSE SURGERY      TUBAL LIGATION  2022     Family History   Problem Relation Name Age of Onset    Miscarriages / Stillbirths Mother Yolanda     Hyperlipidemia Mother Yolanda     Hypertension Father Jose David     Stroke Father Jose David     Arthritis Maternal Grandmother Hannah     Diabetes Paternal Grandmother Ifeoma      Social History[1]  Review of Systems   Constitutional: Negative.    HENT: Negative.     Respiratory: Negative.     Cardiovascular: Negative.    Gastrointestinal: Negative.    Musculoskeletal: Negative.    Skin: Negative.    Neurological: Negative.    All other systems reviewed and are negative.      Physical Exam     Initial Vitals [25 0019]   BP Pulse Resp Temp SpO2   (!) 136/99 95 20 98.6 °F (37 °C) 100 %      MAP       --         Physical Exam    Nursing note and vitals reviewed.  Constitutional: She appears well-developed and well-nourished.   HENT:   Head: Normocephalic and atraumatic.   Eyes: EOM are normal. Pupils  are equal, round, and reactive to light.   Neck: Neck supple.   Normal range of motion.  Cardiovascular:  Normal rate, regular rhythm, normal heart sounds and intact distal pulses.           Pulmonary/Chest: Breath sounds normal. No respiratory distress. She has no wheezes.   Abdominal: Abdomen is soft. There is no abdominal tenderness.   Musculoskeletal:         General: Normal range of motion.      Cervical back: Normal range of motion and neck supple.     Neurological: She is alert and oriented to person, place, and time. She has normal strength.   Skin: Skin is warm and dry.         ED Course   Procedures  Labs Reviewed   COMPREHENSIVE METABOLIC PANEL - Abnormal       Result Value    Sodium 139      Potassium 3.7      Chloride 105      CO2 28      Glucose 112 (*)     Blood Urea Nitrogen 17.9      Creatinine 1.01      Calcium 8.7      Protein Total 6.9      Albumin 3.1 (*)     Globulin 3.8 (*)     Albumin/Globulin Ratio 0.8 (*)     Bilirubin Total 0.5      ALP 63      ALT 11      AST 9 (*)     eGFR >60      Anion Gap 6.0      BUN/Creatinine Ratio 18     CBC WITH DIFFERENTIAL - Abnormal    WBC 10.82      RBC 4.37      Hgb 11.1 (*)     Hct 35.8 (*)     MCV 81.9      MCH 25.4 (*)     MCHC 31.0 (*)     RDW 14.2      Platelet 337      MPV 10.3      Neut % 63.4      Lymph % 27.3      Mono % 6.6      Eos % 2.2      Basophil % 0.3      Imm Grans % 0.2      Neut # 6.87      Lymph # 2.95      Mono # 0.71      Eos # 0.24      Baso # 0.03      Imm Gran # 0.02     TROPONIN I - Normal    Troponin-I <0.010     CBC W/ AUTO DIFFERENTIAL    Narrative:     The following orders were created for panel order CBC auto differential.  Procedure                               Abnormality         Status                     ---------                               -----------         ------                     CBC with Differential[[id number]]       Abnormal            Final result                 Please view results for these tests on the  "individual orders.     EKG Readings: (Independently Interpreted)   Rhythm: Normal Sinus Rhythm. Heart Rate: 91. Ectopy: No Ectopy. Conduction: Normal. ST Segments: Normal ST Segments. T Waves: Normal. Clinical Impression: Normal Sinus Rhythm       Imaging Results    None          Medications   ibuprofen tablet 600 mg (has no administration in time range)     Medical Decision Making  39-year-old female with left-sided chest pain and some left shoulder pain that began while she was lying in bed; this was around 2 hours ago; no diaphoresis, no shortness of breath, no nausea, no pleuritic pain, no recent injury    DIFFERENTIAL DIAGNOSIS- chest wall pain, costochondritis, ACS    Amount and/or Complexity of Data Reviewed  Labs: ordered. Decision-making details documented in ED Course.  ECG/medicine tests: ordered and independent interpretation performed. Decision-making details documented in ED Course.    Risk  Prescription drug management.  Risk Details: ECG and labs unremarkable; reassured patient;   patient will be given NSAID ibuprofen 600 mg as needed    Patient Vitals for the past 24 hrs:   BP Temp Temp src Pulse Resp SpO2 Height Weight   06/17/25 0116 (!) 137/94 -- -- 75 19 100 % -- --   06/17/25 0019 (!) 136/99 98.6 °F (37 °C) Oral 95 20 100 % 5' 6" (1.676 m) 119.3 kg (263 lb)        The patient is resting comfortably and in no acute distress.  She states that her symptoms have improved after treatment in Emergency Department. I personally discussed her test results and treatment plan.  Gave strict ED precautions.  Specific conditions for return to the emergency department and importance of follow up with her primary care provided or the physician listed on the discharge instructions.  Patient voices understanding and agrees to the plan discussed. All patients' questions have been answered at this time.   She has remained hemodynamically stable throughout entire stay in ED and is stable for discharge home.         "                       Clinical Impression:  Final diagnoses:  [R07.9] Chest pain  [R07.89] Atypical chest pain (Primary)          ED Disposition Condition    Discharge Stable          ED Prescriptions       Medication Sig Dispense Start Date End Date Auth. Provider    ibuprofen (ADVIL,MOTRIN) 600 MG tablet Take 1 tablet (600 mg total) by mouth every 6 (six) hours as needed for Pain. 20 tablet 6/17/2025 -- Robbie Jorgensen MD          Follow-up Information       Follow up With Specialties Details Why Contact Info    Katia Renae MD Family Medicine, Urgent Care In 1 week As needed [address] [place] [id number]  [phone]                     [1]   Social History  Tobacco Use    Smoking status: Never    Smokeless tobacco: Never   Substance Use Topics    Alcohol use: Not Currently    Drug use: Never        Robbie Jorgensen MD  06/17/25 013

## (undated) DEVICE — GLOVE PROTEXIS BLUE LATEX 7

## (undated) DEVICE — CABLE BIPOLAR HIGH FREQUENCY

## (undated) DEVICE — GLOVE PROTEXIS HYDROGEL SZ7.5

## (undated) DEVICE — NDL HYPODERMIC BLUNT 18G 1.5IN

## (undated) DEVICE — PACK GYN LAPAROSCOPY HZD

## (undated) DEVICE — NDL SAFETY 22G X 1.5 ECLIPSE

## (undated) DEVICE — SUT MCRYL PLUS 4-0 PS2 27IN

## (undated) DEVICE — JELLY SURGILUBE LUBE TUBE 2OZ

## (undated) DEVICE — TROCAR ENDOPATH XCEL 5X100MM

## (undated) DEVICE — Device

## (undated) DEVICE — APPLICATOR STRL COT 2INNR 6IN

## (undated) DEVICE — SOL ELECTROLUBE ANTI-STIC

## (undated) DEVICE — SOL IRRI STRL WATER 1000ML

## (undated) DEVICE — ADHESIVE DERMABOND ADVANCED

## (undated) DEVICE — GLOVE PROTEXIS LTX MICRO 6.5

## (undated) DEVICE — PAD CURITY MATERNITY PERI

## (undated) DEVICE — SEE MEDLINE ITEM 154981

## (undated) DEVICE — CANNULA ENDOPATH XCEL 5X100MM